# Patient Record
Sex: FEMALE | Race: WHITE | NOT HISPANIC OR LATINO | URBAN - METROPOLITAN AREA
[De-identification: names, ages, dates, MRNs, and addresses within clinical notes are randomized per-mention and may not be internally consistent; named-entity substitution may affect disease eponyms.]

---

## 2018-05-31 ENCOUNTER — INPATIENT (INPATIENT)
Facility: HOSPITAL | Age: 28
LOS: 0 days | Discharge: HOME | End: 2018-06-01
Attending: OBSTETRICS & GYNECOLOGY | Admitting: OBSTETRICS & GYNECOLOGY
Payer: MEDICAID

## 2018-05-31 VITALS — SYSTOLIC BLOOD PRESSURE: 127 MMHG | HEART RATE: 85 BPM | DIASTOLIC BLOOD PRESSURE: 72 MMHG

## 2018-05-31 LAB
AMPHET UR-MCNC: NEGATIVE — SIGNIFICANT CHANGE UP
APPEARANCE UR: (no result)
BACTERIA # UR AUTO: (no result) /HPF
BARBITURATES UR SCN-MCNC: NEGATIVE — SIGNIFICANT CHANGE UP
BASOPHILS # BLD AUTO: 0.05 K/UL — SIGNIFICANT CHANGE UP (ref 0–0.2)
BASOPHILS NFR BLD AUTO: 0.4 % — SIGNIFICANT CHANGE UP (ref 0–1)
BENZODIAZ UR-MCNC: NEGATIVE — SIGNIFICANT CHANGE UP
BILIRUB UR-MCNC: NEGATIVE — SIGNIFICANT CHANGE UP
BLD GP AB SCN SERPL QL: SIGNIFICANT CHANGE UP
COCAINE METAB.OTHER UR-MCNC: NEGATIVE — SIGNIFICANT CHANGE UP
COLOR SPEC: YELLOW — SIGNIFICANT CHANGE UP
DIFF PNL FLD: NEGATIVE — SIGNIFICANT CHANGE UP
EOSINOPHIL # BLD AUTO: 0.47 K/UL — SIGNIFICANT CHANGE UP (ref 0–0.7)
EOSINOPHIL NFR BLD AUTO: 4 % — SIGNIFICANT CHANGE UP (ref 0–8)
EPI CELLS # UR: (no result) /HPF
GLUCOSE UR QL: NEGATIVE MG/DL — SIGNIFICANT CHANGE UP
HCT VFR BLD CALC: 35.3 % — LOW (ref 37–47)
HGB BLD-MCNC: 12.4 G/DL — SIGNIFICANT CHANGE UP (ref 12–16)
IMM GRANULOCYTES NFR BLD AUTO: 1 % — HIGH (ref 0.1–0.3)
KETONES UR-MCNC: 15
LEUKOCYTE ESTERASE UR-ACNC: NEGATIVE — SIGNIFICANT CHANGE UP
LYMPHOCYTES # BLD AUTO: 19.9 % — LOW (ref 20.5–51.1)
LYMPHOCYTES # BLD AUTO: 2.33 K/UL — SIGNIFICANT CHANGE UP (ref 1.2–3.4)
MCHC RBC-ENTMCNC: 30.8 PG — SIGNIFICANT CHANGE UP (ref 27–31)
MCHC RBC-ENTMCNC: 35.1 G/DL — SIGNIFICANT CHANGE UP (ref 32–37)
MCV RBC AUTO: 87.6 FL — SIGNIFICANT CHANGE UP (ref 81–99)
METHADONE UR-MCNC: NEGATIVE — SIGNIFICANT CHANGE UP
MONOCYTES # BLD AUTO: 1.06 K/UL — HIGH (ref 0.1–0.6)
MONOCYTES NFR BLD AUTO: 9.1 % — SIGNIFICANT CHANGE UP (ref 1.7–9.3)
NEUTROPHILS # BLD AUTO: 7.67 K/UL — HIGH (ref 1.4–6.5)
NEUTROPHILS NFR BLD AUTO: 65.6 % — SIGNIFICANT CHANGE UP (ref 42.2–75.2)
NITRITE UR-MCNC: NEGATIVE — SIGNIFICANT CHANGE UP
NRBC # BLD: 0 /100 WBCS — SIGNIFICANT CHANGE UP (ref 0–0)
OPIATES UR-MCNC: NEGATIVE — SIGNIFICANT CHANGE UP
PCP SPEC-MCNC: SIGNIFICANT CHANGE UP
PH UR: 6.5 — SIGNIFICANT CHANGE UP (ref 5–8)
PLATELET # BLD AUTO: 237 K/UL — SIGNIFICANT CHANGE UP (ref 130–400)
PRENATAL SYPHILIS TEST: SIGNIFICANT CHANGE UP
PROPOXYPHENE QUALITATIVE URINE RESULT: NEGATIVE — SIGNIFICANT CHANGE UP
PROT UR-MCNC: NEGATIVE MG/DL — SIGNIFICANT CHANGE UP
RBC # BLD: 4.03 M/UL — LOW (ref 4.2–5.4)
RBC # FLD: 12.6 % — SIGNIFICANT CHANGE UP (ref 11.5–14.5)
SP GR SPEC: 1.01 — SIGNIFICANT CHANGE UP (ref 1.01–1.03)
TYPE + AB SCN PNL BLD: SIGNIFICANT CHANGE UP
UROBILINOGEN FLD QL: 0.2 MG/DL — SIGNIFICANT CHANGE UP (ref 0.2–0.2)
WBC # BLD: 11.7 K/UL — HIGH (ref 4.8–10.8)
WBC # FLD AUTO: 11.7 K/UL — HIGH (ref 4.8–10.8)
WBC UR QL: SIGNIFICANT CHANGE UP /HPF

## 2018-05-31 PROCEDURE — 59409 OBSTETRICAL CARE: CPT | Mod: U9

## 2018-05-31 RX ORDER — OXYTOCIN 10 UNIT/ML
125 VIAL (ML) INJECTION
Qty: 20 | Refills: 0 | Status: COMPLETED | OUTPATIENT
Start: 2018-05-31

## 2018-05-31 RX ORDER — OXYTOCIN 10 UNIT/ML
1 VIAL (ML) INJECTION
Qty: 30 | Refills: 0 | Status: DISCONTINUED | OUTPATIENT
Start: 2018-05-31 | End: 2018-06-01

## 2018-05-31 RX ORDER — AMPICILLIN TRIHYDRATE 250 MG
1 CAPSULE ORAL EVERY 4 HOURS
Qty: 0 | Refills: 0 | Status: DISCONTINUED | OUTPATIENT
Start: 2018-05-31 | End: 2018-05-31

## 2018-05-31 RX ORDER — OXYTOCIN 10 UNIT/ML
125 VIAL (ML) INJECTION
Qty: 20 | Refills: 0 | Status: DISCONTINUED | OUTPATIENT
Start: 2018-05-31 | End: 2018-06-01

## 2018-05-31 RX ORDER — DIBUCAINE 1 %
1 OINTMENT (GRAM) RECTAL EVERY 4 HOURS
Qty: 0 | Refills: 0 | Status: DISCONTINUED | OUTPATIENT
Start: 2018-05-31 | End: 2018-06-01

## 2018-05-31 RX ORDER — DOCUSATE SODIUM 100 MG
100 CAPSULE ORAL
Qty: 0 | Refills: 0 | Status: DISCONTINUED | OUTPATIENT
Start: 2018-05-31 | End: 2018-06-01

## 2018-05-31 RX ORDER — BENZOCAINE 10 %
1 GEL (GRAM) MUCOUS MEMBRANE EVERY 6 HOURS
Qty: 0 | Refills: 0 | Status: DISCONTINUED | OUTPATIENT
Start: 2018-05-31 | End: 2018-06-01

## 2018-05-31 RX ORDER — IBUPROFEN 200 MG
600 TABLET ORAL EVERY 6 HOURS
Qty: 0 | Refills: 0 | Status: DISCONTINUED | OUTPATIENT
Start: 2018-05-31 | End: 2018-06-01

## 2018-05-31 RX ORDER — SODIUM CHLORIDE 9 MG/ML
125 INJECTION, SOLUTION INTRAVENOUS ONCE
Qty: 0 | Refills: 0 | Status: DISCONTINUED | OUTPATIENT
Start: 2018-05-31 | End: 2018-05-31

## 2018-05-31 RX ORDER — SIMETHICONE 80 MG/1
80 TABLET, CHEWABLE ORAL EVERY 6 HOURS
Qty: 0 | Refills: 0 | Status: DISCONTINUED | OUTPATIENT
Start: 2018-05-31 | End: 2018-06-01

## 2018-05-31 RX ORDER — GLYCERIN ADULT
1 SUPPOSITORY, RECTAL RECTAL AT BEDTIME
Qty: 0 | Refills: 0 | Status: DISCONTINUED | OUTPATIENT
Start: 2018-05-31 | End: 2018-06-01

## 2018-05-31 RX ORDER — AMPICILLIN TRIHYDRATE 250 MG
CAPSULE ORAL
Qty: 0 | Refills: 0 | Status: DISCONTINUED | OUTPATIENT
Start: 2018-05-31 | End: 2018-05-31

## 2018-05-31 RX ORDER — DIPHENHYDRAMINE HCL 50 MG
25 CAPSULE ORAL EVERY 6 HOURS
Qty: 0 | Refills: 0 | Status: DISCONTINUED | OUTPATIENT
Start: 2018-05-31 | End: 2018-06-01

## 2018-05-31 RX ORDER — AER TRAVELER 0.5 G/1
1 SOLUTION RECTAL; TOPICAL EVERY 4 HOURS
Qty: 0 | Refills: 0 | Status: DISCONTINUED | OUTPATIENT
Start: 2018-05-31 | End: 2018-06-01

## 2018-05-31 RX ORDER — CITRIC ACID/SODIUM CITRATE 300-500 MG
15 SOLUTION, ORAL ORAL EVERY 4 HOURS
Qty: 0 | Refills: 0 | Status: DISCONTINUED | OUTPATIENT
Start: 2018-05-31 | End: 2018-05-31

## 2018-05-31 RX ORDER — SODIUM CHLORIDE 9 MG/ML
3 INJECTION INTRAMUSCULAR; INTRAVENOUS; SUBCUTANEOUS EVERY 8 HOURS
Qty: 0 | Refills: 0 | Status: DISCONTINUED | OUTPATIENT
Start: 2018-05-31 | End: 2018-06-01

## 2018-05-31 RX ORDER — CEFAZOLIN SODIUM 1 G
VIAL (EA) INJECTION
Qty: 0 | Refills: 0 | Status: DISCONTINUED | OUTPATIENT
Start: 2018-05-31 | End: 2018-05-31

## 2018-05-31 RX ORDER — SODIUM CHLORIDE 9 MG/ML
1000 INJECTION, SOLUTION INTRAVENOUS
Qty: 0 | Refills: 0 | Status: DISCONTINUED | OUTPATIENT
Start: 2018-05-31 | End: 2018-05-31

## 2018-05-31 RX ORDER — MAGNESIUM HYDROXIDE 400 MG/1
30 TABLET, CHEWABLE ORAL
Qty: 0 | Refills: 0 | Status: DISCONTINUED | OUTPATIENT
Start: 2018-05-31 | End: 2018-06-01

## 2018-05-31 RX ORDER — ACETAMINOPHEN 500 MG
650 TABLET ORAL EVERY 6 HOURS
Qty: 0 | Refills: 0 | Status: DISCONTINUED | OUTPATIENT
Start: 2018-05-31 | End: 2018-06-01

## 2018-05-31 RX ORDER — LANOLIN
1 OINTMENT (GRAM) TOPICAL EVERY 6 HOURS
Qty: 0 | Refills: 0 | Status: DISCONTINUED | OUTPATIENT
Start: 2018-05-31 | End: 2018-06-01

## 2018-05-31 RX ORDER — AMPICILLIN TRIHYDRATE 250 MG
2 CAPSULE ORAL ONCE
Qty: 0 | Refills: 0 | Status: COMPLETED | OUTPATIENT
Start: 2018-05-31 | End: 2018-05-31

## 2018-05-31 RX ADMIN — Medication 216 GRAM(S): at 04:43

## 2018-05-31 RX ADMIN — Medication 1 APPLICATION(S): at 16:31

## 2018-05-31 RX ADMIN — Medication 1 SPRAY(S): at 16:31

## 2018-05-31 RX ADMIN — Medication 108 GRAM(S): at 12:38

## 2018-05-31 RX ADMIN — Medication 600 MILLIGRAM(S): at 16:31

## 2018-05-31 RX ADMIN — Medication 1 MILLIUNIT(S)/MIN: at 08:39

## 2018-05-31 RX ADMIN — Medication 1 TABLET(S): at 21:44

## 2018-05-31 RX ADMIN — SODIUM CHLORIDE 3 MILLILITER(S): 9 INJECTION INTRAMUSCULAR; INTRAVENOUS; SUBCUTANEOUS at 21:28

## 2018-05-31 RX ADMIN — Medication 108 GRAM(S): at 21:31

## 2018-05-31 RX ADMIN — Medication 208 GRAM(S): at 08:35

## 2018-05-31 NOTE — OB PROVIDER H&P - HISTORY OF PRESENT ILLNESS
Pt is a 26yo  at 39w2d with EDC of 18 dated by LMP c/w first trimester sono, here for LOF since 0200, green in color, was sleeping and felt gush of fluid, continually leaking since. Associated with irregular ctx since arriving to triage, 1/10 intensity. Denies vaginal bleeding. Reports good fetal movement. Denies complications this pregnancy.

## 2018-05-31 NOTE — OB PROVIDER H&P - NSHPPHYSICALEXAM_GEN_ALL_CORE
Vital Signs Last 24 Hrs  T(C): 36.9 (31 May 2018 03:47), Max: 37.7 (31 May 2018 03:37)  T(F): 98.4 (31 May 2018 03:47), Max: 99.8 (31 May 2018 03:37)  HR: 85 (31 May 2018 03:47) (85 - 85)  BP: 127/72 (31 May 2018 03:47) (127/72 - 127/72)  RR: 16 (31 May 2018 03:47) (16 - 16)    Udip: 15 ketones, tr blood, tr protein, tr leuks    EFM: 120/mod/accels+  [place]: 2-4mins   SVE: 4/60/-2, vtx, grossly ruptured  Speculum: positive pooling, nitrazine, ferning, light meconium

## 2018-05-31 NOTE — OB PROVIDER DELIVERY SUMMARY - NSPROVIDERDELIVERYNOTE_OBGYN_ALL_OB_FT
Patient fully dilated, OA, pushed to deliver viable .  Apgar 9/9.  Placenta intact w/3vc.  First degree perineal laceration repaired w/3-0 chromic.   cc.  Tolerated well.  Patient received GBS prophylaxis.

## 2018-05-31 NOTE — OB PROVIDER H&P - NS_OBGYNHISTORY_OBGYN_ALL_OB_FT
OB:  - FTNSVD x3 - 12 (female), 2013 (female), 2015 (male), largest [id number]; ectopic treated with methotrexate   GYN: darnell

## 2018-05-31 NOTE — PROGRESS NOTE ADULT - SUBJECTIVE AND OBJECTIVE BOX
MICHAEL PGY1 Note:     Patient seen and examined at bedside. Comfortable, denies complaints.      T(C): 37.2 (18 @ 07:00), Max: 37.7 (18 @ 03:37)  HR: 79 (18 @ 09:31) (73 - 93)  BP: 107/53 (18 @ 09:31) (102/51 - 128/60)  RR: 18 (18 @ 06:51) (16 - 18)  EFM: 130/mod/accels+  [place]: Irregular ctx   SVE: deferred     Meds: ampicillin  IVPB 1 Gram(s) IV Intermittent every 4 hours  citric acid/sodium citrate Solution 15 milliLiter(s) Oral every 4 hours  lactated ringers Bolus 125 milliLiter(s) IV Bolus once  lactated ringers. 1000 milliLiter(s) IV Continuous <Continuous>  oxytocin Infusion 125 milliUNIT(s)/Min IV Continuous <Continuous>  oxytocin Infusion 1 milliUNIT(s)/Min IV Continuous <Continuous>      Labs:      AB pos, RPR- NR                  12.4   11.70 )-----------( 237      ( 31 May 2018 05:08 )             35.3         Urinalysis Basic - ( 31 May 2018 05:08 )    Color: Yellow / Appearance: Cloudy / S.015 / pH: x  Gluc: x / Ketone: 15  / Bili: Negative / Urobili: 0.2 mg/dL   Blood: x / Protein: Negative mg/dL / Nitrite: Negative   Leuk Esterase: Negative / RBC: x / WBC 1-2 /HPF   Sq Epi: x / Non Sq Epi: Few /HPF / Bacteria: Few /HPF          A/P: 28yo  at 39w2d GA, GBS pos, SROM, light meconium, in early labor on pitocin for augmentation  - c/w pitocin   - clear liquids   - pain mgmt prn   - continuous EFM/toco    Dr. Valle and Dr. Vega aware.

## 2018-05-31 NOTE — OB PROVIDER H&P - ASSESSMENT
Pt is a 26yo  at 39w2d, GBS bacteruria, SROM at 0200, light meconium and in early labor.  Admit to labor and delivery: clear liquid diet, IV hydration, continuous EFM/toco, pain management PRN, admission labs.  Ampicillin for GBS prophylaxis.

## 2018-05-31 NOTE — OB PROVIDER H&P - NSNYCREQUIREMENTS_OBGYN_ALL_OB
ADD [facility] REQUIREMENTS SECTION ([facility]/J/SI)...

## 2018-05-31 NOTE — OB PROVIDER H&P - ATTENDING COMMENTS
26 y/o p3013 at 39.2 wks, w/c/o srom, meconium stained fluid, occas ctx, no bleeding, good fm    nsd x 3  gbs +    plan: admit, analgesia, pitocin prn - r/b/a rev, ques answered, anticipate nsd

## 2018-06-01 ENCOUNTER — TRANSCRIPTION ENCOUNTER (OUTPATIENT)
Age: 28
End: 2018-06-01

## 2018-06-01 VITALS
TEMPERATURE: 97 F | RESPIRATION RATE: 18 BRPM | DIASTOLIC BLOOD PRESSURE: 73 MMHG | SYSTOLIC BLOOD PRESSURE: 124 MMHG | HEART RATE: 87 BPM

## 2018-06-01 LAB
HCT VFR BLD CALC: 32.7 % — LOW (ref 37–47)
HGB BLD-MCNC: 11 G/DL — LOW (ref 12–16)
MCHC RBC-ENTMCNC: 30 PG — SIGNIFICANT CHANGE UP (ref 27–31)
MCHC RBC-ENTMCNC: 33.6 G/DL — SIGNIFICANT CHANGE UP (ref 32–37)
MCV RBC AUTO: 89.1 FL — SIGNIFICANT CHANGE UP (ref 81–99)
NRBC # BLD: 0 /100 WBCS — SIGNIFICANT CHANGE UP (ref 0–0)
PLATELET # BLD AUTO: 201 K/UL — SIGNIFICANT CHANGE UP (ref 130–400)
RBC # BLD: 3.67 M/UL — LOW (ref 4.2–5.4)
RBC # FLD: 12.6 % — SIGNIFICANT CHANGE UP (ref 11.5–14.5)
WBC # BLD: 13.62 K/UL — HIGH (ref 4.8–10.8)
WBC # FLD AUTO: 13.62 K/UL — HIGH (ref 4.8–10.8)

## 2018-06-01 RX ORDER — ACETAMINOPHEN 500 MG
2 TABLET ORAL
Qty: 0 | Refills: 0 | DISCHARGE
Start: 2018-06-01

## 2018-06-01 RX ORDER — AER TRAVELER 0.5 G/1
1 SOLUTION RECTAL; TOPICAL
Qty: 0 | Refills: 0 | DISCHARGE
Start: 2018-06-01

## 2018-06-01 RX ORDER — IBUPROFEN 200 MG
1 TABLET ORAL
Qty: 0 | Refills: 0 | DISCHARGE
Start: 2018-06-01

## 2018-06-01 NOTE — PROGRESS NOTE ADULT - SUBJECTIVE AND OBJECTIVE BOX
OB attending  PPD #1    Pt doing well, pain well controlled. No overnight events, no acute complaints.    Ambulating: Yes  Voiding: Yes  Flatus: Yes  Bowel movements: Yes   Breast or bottle feeding: Breastfeeding  Diet: Regular    PAST MEDICAL & SURGICAL HISTORY:  No pertinent past medical history  No significant past surgical history      Physical Exam  Vital Signs Last 24 Hrs  T(C): 36.2 (2018 07:46), Max: 37.4 (31 May 2018 14:19)  T(F): 97.2 (2018 07:46), Max: 99.4 (31 May 2018 14:19)  HR: 87 (2018 07:46) (73 - 114)  BP: 124/73 (2018 07:46) (107/53 - 137/68)  BP(mean): --  RR: 18 (2018 07:46) (18 - 20)  SpO2: --  Gen: AAOx3, NAD  Abd: Soft, nontender, nondistended, BS+  Fundus: Firm, below umbilicus  Lochia: normal  Ext: No calf tenderness, no swelling    Labs:                        11.0   13.62 )-----------( 201      ( 2018 07:04 )             32.7       A/P:28 yo , s/p , PPD #1, doing well  - d/c home

## 2018-06-01 NOTE — DISCHARGE NOTE OB - MEDICATION SUMMARY - MEDICATIONS TO TAKE
I will START or STAY ON the medications listed below when I get home from the hospital:    acetaminophen 325 mg oral tablet  -- 2 tab(s) by mouth every 6 hours, As needed, Mild Pain  -- Indication: For pain    ibuprofen 600 mg oral tablet  -- 1 tab(s) by mouth every 6 hours, As needed, Moderate Pain  -- Indication: For pain    witch hazel 50% topical pad  -- 1 application on skin every 4 hours, As needed, Perineal Discomfort  -- Indication: For pain

## 2018-06-01 NOTE — DISCHARGE NOTE OB - CARE PROVIDER_API CALL
Manny Cheatham), Obstetrics and Gynecology  [address] [place] [address]  Phone: [phone]  Fax: [phone]

## 2018-06-01 NOTE — DISCHARGE NOTE OB - MEDICATION SUMMARY - MEDICATIONS TO STOP TAKING
I will STOP taking the medications listed below when I get home from the hospital:    acetaminophen 325 mg oral tablet  -- 2 tab(s) by mouth every 6 hours, As needed, Mild Pain    ibuprofen 600 mg oral tablet  -- 1 tab(s) by mouth every 6 hours, As needed, Moderate Pain    witch hazel 50% topical pad  -- 1 application on skin every 4 hours, As needed, Perineal Discomfort

## 2018-06-01 NOTE — DISCHARGE NOTE OB - HOSPITAL COURSE
DATE OF DISCHARGE: 18 @ 09:15    HISTORY OF PRESENT ILLNESS/HOSPITAL COURSE: HPI:  Pt is a 26yo  at 39w2d with EDC of 18 dated by LMP c/w first trimester sono, here for LOF since 0200, green in color, was sleeping and felt gush of fluid, continually leaking since. Associated with irregular ctx since arriving to triage, /10 intensity. Denies vaginal bleeding. Reports good fetal movement. Denies complications this pregnancy. (31 May 2018 04:17)    PAST MEDICAL & SURGICAL HISTORY:  No pertinent past medical history  No significant past surgical history      PROCEDURES PERFORMED: vaginal delivery	    COMPLICATIONS: uncomplicated     POST PARTUM COURSE: uncomplicated      FINAL DIAGNOSIS:  vaginal delivery    LABS:                       11.0   13.62 )-----------( 201      ( 2018 07:04 )             32.7

## 2018-06-06 DIAGNOSIS — Z3A.39 39 WEEKS GESTATION OF PREGNANCY: ICD-10-CM

## 2018-06-06 DIAGNOSIS — Z34.83 ENCOUNTER FOR SUPERVISION OF OTHER NORMAL PREGNANCY, THIRD TRIMESTER: ICD-10-CM

## 2019-04-30 ENCOUNTER — APPOINTMENT (OUTPATIENT)
Dept: ANTEPARTUM | Facility: CLINIC | Age: 29
End: 2019-04-30
Payer: MEDICAID

## 2019-04-30 ENCOUNTER — ASOB RESULT (OUTPATIENT)
Age: 29
End: 2019-04-30

## 2019-04-30 PROCEDURE — 99201 OFFICE OUTPATIENT NEW 10 MINUTES: CPT | Mod: 25,TH

## 2019-04-30 PROCEDURE — 76801 OB US < 14 WKS SINGLE FETUS: CPT

## 2019-05-14 ENCOUNTER — RESULT REVIEW (OUTPATIENT)
Age: 29
End: 2019-05-14

## 2019-05-14 ENCOUNTER — OUTPATIENT (OUTPATIENT)
Dept: OUTPATIENT SERVICES | Facility: HOSPITAL | Age: 29
LOS: 1 days | Discharge: HOME | End: 2019-05-14
Payer: MEDICAID

## 2019-05-14 ENCOUNTER — APPOINTMENT (OUTPATIENT)
Dept: ANTEPARTUM | Facility: CLINIC | Age: 29
End: 2019-05-14

## 2019-05-14 VITALS
DIASTOLIC BLOOD PRESSURE: 54 MMHG | SYSTOLIC BLOOD PRESSURE: 104 MMHG | HEART RATE: 82 BPM | OXYGEN SATURATION: 99 % | RESPIRATION RATE: 20 BRPM

## 2019-05-14 VITALS
DIASTOLIC BLOOD PRESSURE: 66 MMHG | HEIGHT: 64 IN | TEMPERATURE: 98 F | WEIGHT: 156.31 LBS | SYSTOLIC BLOOD PRESSURE: 130 MMHG | RESPIRATION RATE: 18 BRPM | OXYGEN SATURATION: 97 % | HEART RATE: 83 BPM

## 2019-05-14 PROCEDURE — 59820 CARE OF MISCARRIAGE: CPT

## 2019-05-14 PROCEDURE — 88305 TISSUE EXAM BY PATHOLOGIST: CPT | Mod: 26

## 2019-05-14 RX ORDER — SODIUM CHLORIDE 9 MG/ML
1000 INJECTION, SOLUTION INTRAVENOUS
Refills: 0 | Status: DISCONTINUED | OUTPATIENT
Start: 2019-05-14 | End: 2019-05-29

## 2019-05-14 RX ORDER — OXYCODONE AND ACETAMINOPHEN 5; 325 MG/1; MG/1
1 TABLET ORAL EVERY 4 HOURS
Refills: 0 | Status: DISCONTINUED | OUTPATIENT
Start: 2019-05-14 | End: 2019-05-14

## 2019-05-14 RX ORDER — ONDANSETRON 8 MG/1
4 TABLET, FILM COATED ORAL ONCE
Refills: 0 | Status: DISCONTINUED | OUTPATIENT
Start: 2019-05-14 | End: 2019-05-29

## 2019-05-14 RX ADMIN — SODIUM CHLORIDE 100 MILLILITER(S): 9 INJECTION, SOLUTION INTRAVENOUS at 14:34

## 2019-05-14 RX ADMIN — Medication 270 MILLIGRAM(S): at 14:54

## 2019-05-14 NOTE — H&P ADULT - NSHPPHYSICALEXAM_GEN_ALL_CORE
ICU Vital Signs Last 24 Hrs  T(C): 36.9 (14 May 2019 12:15), Max: 36.9 (14 May 2019 12:15)  T(F): 98.4 (14 May 2019 12:15), Max: 98.4 (14 May 2019 12:15)  HR: 83 (14 May 2019 12:15) (83 - 83)  BP: 130/66 (14 May 2019 12:15) (130/66 - 130/66)  BP(mean): --  ABP: --  ABP(mean): --  RR: 18 (14 May 2019 12:15) (18 - 18)  SpO2: 97% (14 May 2019 12:15) (97% - 97%)  GEN: NAD  Heart: RRR  Lungs CTAB  ABd: soft non tender  SVE: deferred

## 2019-05-14 NOTE — H&P ADULT - HISTORY OF PRESENT ILLNESS
27 yo P4 @ 12w6d by LMP (2019 presents for scheduled suction D&C for missed ab. Denies fever/chills, nausea/vomiting, diarrhea. + vaginal bleeding, no clots. No abd cramping.  h/o  x 4, no complications.  no h/o fibroids, cysts, STIs, abnormal pap smears

## 2019-05-14 NOTE — PRE-ANESTHESIA EVALUATION ADULT - NSATTENDATTESTRD_GEN_ALL_CORE
The patient has been re-examined and I agree with the above assessment or I updated with my findings. - - -

## 2019-05-14 NOTE — PRE-ANESTHESIA EVALUATION ADULT - NSANTHOSAYNRD_GEN_A_CORE
No. LETY screening performed.  STOP BANG Legend: 0-2 = LOW Risk; 3-4 = INTERMEDIATE Risk; 5-8 = HIGH Risk

## 2019-05-14 NOTE — ASU DISCHARGE PLAN (ADULT/PEDIATRIC) - CARE PROVIDER_API CALL
Manny Cheatham)  Obstetrics and Gynecology  [address] [place] [address]  Phone: [phone]  Fax: [phone]  Follow Up Time: 2 weeks

## 2019-05-14 NOTE — BRIEF OPERATIVE NOTE - NSICDXBRIEFPROCEDURE_GEN_ALL_CORE_FT
PROCEDURES:  Dilation and curettage of uterus using suction for missed  in first trimester 14-May-2019 14:25:31  Mickey Mcduffie

## 2019-05-14 NOTE — ASU DISCHARGE PLAN (ADULT/PEDIATRIC) - CALL YOUR DOCTOR IF YOU HAVE ANY OF THE FOLLOWING:
Nausea and vomiting that does not stop/Unable to urinate/Pain not relieved by Medications/Bleeding that does not stop/Inability to tolerate liquids or foods

## 2019-05-15 LAB — SURGICAL PATHOLOGY STUDY: SIGNIFICANT CHANGE UP

## 2019-05-18 DIAGNOSIS — O02.1 MISSED ABORTION: ICD-10-CM

## 2019-09-17 NOTE — OB RN PATIENT PROFILE - NS_ACCOMPAINEDBY_OBGYN_ALL_OB
Pt provided results and follow up recommendation.  She stated she does get these often and denies having any pain or irregular bleeding.     Spouse

## 2019-10-17 ENCOUNTER — ASOB RESULT (OUTPATIENT)
Age: 29
End: 2019-10-17

## 2019-10-17 ENCOUNTER — APPOINTMENT (OUTPATIENT)
Dept: ANTEPARTUM | Facility: CLINIC | Age: 29
End: 2019-10-17
Payer: MEDICAID

## 2019-10-17 PROCEDURE — 76820 UMBILICAL ARTERY ECHO: CPT

## 2019-10-17 PROCEDURE — 76805 OB US >/= 14 WKS SNGL FETUS: CPT

## 2019-10-31 ENCOUNTER — APPOINTMENT (OUTPATIENT)
Dept: ANTEPARTUM | Facility: CLINIC | Age: 29
End: 2019-10-31
Payer: MEDICAID

## 2019-10-31 ENCOUNTER — ASOB RESULT (OUTPATIENT)
Age: 29
End: 2019-10-31

## 2019-10-31 PROCEDURE — 76811 OB US DETAILED SNGL FETUS: CPT

## 2019-10-31 PROCEDURE — 76820 UMBILICAL ARTERY ECHO: CPT

## 2019-11-12 ENCOUNTER — APPOINTMENT (OUTPATIENT)
Dept: MATERNAL FETAL MEDICINE | Facility: CLINIC | Age: 29
End: 2019-11-12

## 2019-11-12 ENCOUNTER — APPOINTMENT (OUTPATIENT)
Dept: ANTEPARTUM | Facility: CLINIC | Age: 29
End: 2019-11-12

## 2020-03-30 ENCOUNTER — OUTPATIENT (OUTPATIENT)
Dept: OUTPATIENT SERVICES | Facility: HOSPITAL | Age: 30
LOS: 1 days | Discharge: HOME | End: 2020-03-30
Payer: MEDICAID

## 2020-03-30 VITALS
HEART RATE: 18 BPM | SYSTOLIC BLOOD PRESSURE: 137 MMHG | DIASTOLIC BLOOD PRESSURE: 78 MMHG | RESPIRATION RATE: 95 BRPM | TEMPERATURE: 99 F

## 2020-03-30 VITALS — HEART RATE: 82 BPM | DIASTOLIC BLOOD PRESSURE: 60 MMHG | SYSTOLIC BLOOD PRESSURE: 118 MMHG

## 2020-03-30 LAB
ALBUMIN SERPL ELPH-MCNC: 3.9 G/DL — SIGNIFICANT CHANGE UP (ref 3.5–5.2)
ALP SERPL-CCNC: 157 U/L — HIGH (ref 30–115)
ALT FLD-CCNC: 9 U/L — SIGNIFICANT CHANGE UP (ref 0–41)
ANION GAP SERPL CALC-SCNC: 14 MMOL/L — SIGNIFICANT CHANGE UP (ref 7–14)
APPEARANCE UR: ABNORMAL
AST SERPL-CCNC: 19 U/L — SIGNIFICANT CHANGE UP (ref 0–41)
BACTERIA # UR AUTO: ABNORMAL
BASOPHILS # BLD AUTO: 0.03 K/UL — SIGNIFICANT CHANGE UP (ref 0–0.2)
BASOPHILS NFR BLD AUTO: 0.3 % — SIGNIFICANT CHANGE UP (ref 0–1)
BILIRUB SERPL-MCNC: 0.3 MG/DL — SIGNIFICANT CHANGE UP (ref 0.2–1.2)
BILIRUB UR-MCNC: NEGATIVE — SIGNIFICANT CHANGE UP
BLD GP AB SCN SERPL QL: SIGNIFICANT CHANGE UP
BUN SERPL-MCNC: 8 MG/DL — LOW (ref 10–20)
CALCIUM SERPL-MCNC: 9.2 MG/DL — SIGNIFICANT CHANGE UP (ref 8.5–10.1)
CHLORIDE SERPL-SCNC: 103 MMOL/L — SIGNIFICANT CHANGE UP (ref 98–110)
CO2 SERPL-SCNC: 20 MMOL/L — SIGNIFICANT CHANGE UP (ref 17–32)
COLOR SPEC: SIGNIFICANT CHANGE UP
CREAT ?TM UR-MCNC: 80 MG/DL — SIGNIFICANT CHANGE UP
CREAT SERPL-MCNC: 0.5 MG/DL — LOW (ref 0.7–1.5)
DIFF PNL FLD: NEGATIVE — SIGNIFICANT CHANGE UP
EOSINOPHIL # BLD AUTO: 0.13 K/UL — SIGNIFICANT CHANGE UP (ref 0–0.7)
EOSINOPHIL NFR BLD AUTO: 1.2 % — SIGNIFICANT CHANGE UP (ref 0–8)
EPI CELLS # UR: 4 /HPF — SIGNIFICANT CHANGE UP (ref 0–5)
GLUCOSE SERPL-MCNC: 66 MG/DL — LOW (ref 70–99)
GLUCOSE UR QL: NEGATIVE — SIGNIFICANT CHANGE UP
HCT VFR BLD CALC: 36.1 % — LOW (ref 37–47)
HGB BLD-MCNC: 12.6 G/DL — SIGNIFICANT CHANGE UP (ref 12–16)
HYALINE CASTS # UR AUTO: 1 /LPF — SIGNIFICANT CHANGE UP (ref 0–7)
IMM GRANULOCYTES NFR BLD AUTO: 0.8 % — HIGH (ref 0.1–0.3)
KETONES UR-MCNC: NEGATIVE — SIGNIFICANT CHANGE UP
LDH SERPL L TO P-CCNC: 214 — SIGNIFICANT CHANGE UP (ref 50–242)
LEUKOCYTE ESTERASE UR-ACNC: ABNORMAL
LYMPHOCYTES # BLD AUTO: 2.28 K/UL — SIGNIFICANT CHANGE UP (ref 1.2–3.4)
LYMPHOCYTES # BLD AUTO: 21.6 % — SIGNIFICANT CHANGE UP (ref 20.5–51.1)
MCHC RBC-ENTMCNC: 31.3 PG — HIGH (ref 27–31)
MCHC RBC-ENTMCNC: 34.9 G/DL — SIGNIFICANT CHANGE UP (ref 32–37)
MCV RBC AUTO: 89.6 FL — SIGNIFICANT CHANGE UP (ref 81–99)
MONOCYTES # BLD AUTO: 0.89 K/UL — HIGH (ref 0.1–0.6)
MONOCYTES NFR BLD AUTO: 8.4 % — SIGNIFICANT CHANGE UP (ref 1.7–9.3)
NEUTROPHILS # BLD AUTO: 7.17 K/UL — HIGH (ref 1.4–6.5)
NEUTROPHILS NFR BLD AUTO: 67.7 % — SIGNIFICANT CHANGE UP (ref 42.2–75.2)
NITRITE UR-MCNC: NEGATIVE — SIGNIFICANT CHANGE UP
NRBC # BLD: 0 /100 WBCS — SIGNIFICANT CHANGE UP (ref 0–0)
PH UR: 6 — SIGNIFICANT CHANGE UP (ref 5–8)
PLATELET # BLD AUTO: 242 K/UL — SIGNIFICANT CHANGE UP (ref 130–400)
POTASSIUM SERPL-MCNC: 4.1 MMOL/L — SIGNIFICANT CHANGE UP (ref 3.5–5)
POTASSIUM SERPL-SCNC: 4.1 MMOL/L — SIGNIFICANT CHANGE UP (ref 3.5–5)
PRENATAL SYPHILIS TEST: SIGNIFICANT CHANGE UP
PROT ?TM UR-MCNC: 15 MG/DLG/24H — SIGNIFICANT CHANGE UP
PROT SERPL-MCNC: 6.4 G/DL — SIGNIFICANT CHANGE UP (ref 6–8)
PROT UR-MCNC: NEGATIVE — SIGNIFICANT CHANGE UP
PROT/CREAT UR-RTO: 0.2 RATIO — SIGNIFICANT CHANGE UP (ref 0–0.2)
RBC # BLD: 4.03 M/UL — LOW (ref 4.2–5.4)
RBC # FLD: 12.6 % — SIGNIFICANT CHANGE UP (ref 11.5–14.5)
RBC CASTS # UR COMP ASSIST: 3 /HPF — SIGNIFICANT CHANGE UP (ref 0–4)
SODIUM SERPL-SCNC: 137 MMOL/L — SIGNIFICANT CHANGE UP (ref 135–146)
SP GR SPEC: 1.01 — SIGNIFICANT CHANGE UP (ref 1.01–1.02)
URATE SERPL-MCNC: 5.1 MG/DL — SIGNIFICANT CHANGE UP (ref 2.5–7)
UROBILINOGEN FLD QL: SIGNIFICANT CHANGE UP
WBC # BLD: 10.58 K/UL — SIGNIFICANT CHANGE UP (ref 4.8–10.8)
WBC # FLD AUTO: 10.58 K/UL — SIGNIFICANT CHANGE UP (ref 4.8–10.8)
WBC UR QL: 34 /HPF — HIGH (ref 0–5)

## 2020-03-30 PROCEDURE — 99213 OFFICE O/P EST LOW 20 MIN: CPT | Mod: 25,TH

## 2020-03-30 PROCEDURE — 59025 FETAL NON-STRESS TEST: CPT | Mod: 26

## 2020-03-30 NOTE — OB PROVIDER TRIAGE NOTE - NSHPPHYSICALEXAM_GEN_ALL_CORE
Vital Signs Last 24 Hrs  T(C): 37 (30 Mar 2020 15:07), Max: 37 (30 Mar 2020 15:07)  T(F): 98.6 (30 Mar 2020 15:07), Max: 98.6 (30 Mar 2020 15:07)  HR: 87 (30 Mar 2020 15:52) (18 - 95)  BP: 131/64 (30 Mar 2020 15:52) (131/64 - 139/68)  RR: 95 (30 Mar 2020 15:01) (95 - 95)    Gen: NAD, sitting comfortably  Abd: Gravid, soft, NT, no palpable ctx  SVE: deferred per PMD  EFM: 130/mod/+accels, cat I  [place]: none  Sono: cephalic, BPP 8/8, MVP 3.3cm, left lateral placenta Vital Signs Last 24 Hrs  T(C): 37 (30 Mar 2020 15:07), Max: 37 (30 Mar 2020 15:07)  T(F): 98.6 (30 Mar 2020 15:07), Max: 98.6 (30 Mar 2020 15:07)  HR: 87 (30 Mar 2020 15:52) (18 - 95)  BP: 131/64 (30 Mar 2020 15:52) (131/64 - 139/68)  RR: 95 (30 Mar 2020 15:01) (95 - 95)    Gen: NAD, sitting comfortably  Abd: Gravid, soft, NT, no palpable ctx  SVE: 1/0/-3, vtx, intact  EFM: 130/mod/+accels, cat I  [place]: none  Sono: cephalic, BPP 8/8, MVP 3.3cm, left lateral placenta  Speculum: no lesions on cervix, in vagina or on labia

## 2020-03-30 NOTE — OB PROVIDER TRIAGE NOTE - NSHPLABSRESULTS_GEN_ALL_CORE
Labs:  3/3   HIV NR  RPR NR    12/11       11/20  HSV 1 pos  rubella nonimmune  GBS pos bacteruria  TORCH neg  HSV1 pos IgG    8/26  ucx GBS pos  8/21  AB pos, antibody neg  HIV NR  varicella immune  rubella immune  mumps immune  measles immune  HBsAG NR  RPR NR    Sono:  10/17: 143g (3%), post placenta, UA dopplers normal  10/31: EFW 218g (3%), normal anatomy, post placenta  11/11: 248g (5%), normal anatomy, normal dopplers  12/11: 565g (15%), MVP 3.1cm, vtx

## 2020-03-30 NOTE — OB PROVIDER TRIAGE NOTE - HISTORY OF PRESENT ILLNESS
30yo  @39w4d with YNAICK 4/2 by 1st trimester sonogram presenting to L&D for BP monitoring after elevated BPs in the office of 146/88. Denies headache, changes in vision, chest pain, SOB, RUQ/epigastric pain, N/V, fevers, chills, diarrhea, LE pain/swelling. Denies VB, LOF, ctx.  Good FM.  H/o HSV1 positive serology and cold sores, last one 3months ago.  No history of genital sores, no prodromal symptoms. GBS positive bacteruria.

## 2020-03-30 NOTE — OB PROVIDER TRIAGE NOTE - NSOBPROVIDERNOTE_OBGYN_ALL_OB_FT
30yo  @39w4d, GBS pos, r/o gHTN vs. preeclampsia, BPP 8/8, NST reactive, maternal and fetal wellbeing reassuring  -cont efm/toco  -f/u PELs, UA, urprcr  -BPs q15m      Dr. Murrell and Dr. Mueller aware. 30yo  @39w4d, GBS pos, r/o gHTN vs. preeclampsia, BPP 8/8, NST reactive, maternal and fetal wellbeing reassuring  -cont efm/toco  -f/u PELs, UA, urprcr  -BPs q15m    Dr. Murrell and Dr. Mueller aware.    Addendum: Patient was monitored for 4 hours with all BPs wnl, no criteria met.  Pt remained asymptomatic.  BPP 8/8, NST reactive, Cat I tracing with minimal irregular contractions. PELs within normal limits.  Urprcr pending. To be discharged home with hypertensive and labor precautions.  FKC encouraged. PO hydration encouraged.

## 2020-03-30 NOTE — OB PROVIDER TRIAGE NOTE - NS_OBGYNHISTORY_OBGYN_ALL_OB_FT
OB: FT  x4, largest 9lb, no complication s  SAB x1 with D&C  Ectopic s/p mtx 2016    Gyn: denies fibroids, cysts, abnormal pap smears, STDs.  H/o HSV1 positive serology, h/o cold sores, no history of genital sores.

## 2020-04-04 ENCOUNTER — INPATIENT (INPATIENT)
Facility: HOSPITAL | Age: 30
LOS: 1 days | Discharge: HOME | End: 2020-04-06
Attending: OBSTETRICS & GYNECOLOGY | Admitting: OBSTETRICS & GYNECOLOGY
Payer: MEDICAID

## 2020-04-04 VITALS — TEMPERATURE: 99 F | HEART RATE: 92 BPM | SYSTOLIC BLOOD PRESSURE: 115 MMHG | DIASTOLIC BLOOD PRESSURE: 79 MMHG

## 2020-04-04 DIAGNOSIS — Z98.890 OTHER SPECIFIED POSTPROCEDURAL STATES: Chronic | ICD-10-CM

## 2020-04-04 LAB
BASOPHILS # BLD AUTO: 0.04 K/UL — SIGNIFICANT CHANGE UP (ref 0–0.2)
BASOPHILS NFR BLD AUTO: 0.4 % — SIGNIFICANT CHANGE UP (ref 0–1)
BLD GP AB SCN SERPL QL: SIGNIFICANT CHANGE UP
EOSINOPHIL # BLD AUTO: 0.09 K/UL — SIGNIFICANT CHANGE UP (ref 0–0.7)
EOSINOPHIL NFR BLD AUTO: 0.8 % — SIGNIFICANT CHANGE UP (ref 0–8)
HCT VFR BLD CALC: 38.7 % — SIGNIFICANT CHANGE UP (ref 37–47)
HGB BLD-MCNC: 13.1 G/DL — SIGNIFICANT CHANGE UP (ref 12–16)
IMM GRANULOCYTES NFR BLD AUTO: 0.7 % — HIGH (ref 0.1–0.3)
LYMPHOCYTES # BLD AUTO: 2.29 K/UL — SIGNIFICANT CHANGE UP (ref 1.2–3.4)
LYMPHOCYTES # BLD AUTO: 20.9 % — SIGNIFICANT CHANGE UP (ref 20.5–51.1)
MCHC RBC-ENTMCNC: 30.3 PG — SIGNIFICANT CHANGE UP (ref 27–31)
MCHC RBC-ENTMCNC: 33.9 G/DL — SIGNIFICANT CHANGE UP (ref 32–37)
MCV RBC AUTO: 89.6 FL — SIGNIFICANT CHANGE UP (ref 81–99)
MONOCYTES # BLD AUTO: 0.89 K/UL — HIGH (ref 0.1–0.6)
MONOCYTES NFR BLD AUTO: 8.1 % — SIGNIFICANT CHANGE UP (ref 1.7–9.3)
NEUTROPHILS # BLD AUTO: 7.57 K/UL — HIGH (ref 1.4–6.5)
NEUTROPHILS NFR BLD AUTO: 69.1 % — SIGNIFICANT CHANGE UP (ref 42.2–75.2)
NRBC # BLD: 0 /100 WBCS — SIGNIFICANT CHANGE UP (ref 0–0)
PLATELET # BLD AUTO: 253 K/UL — SIGNIFICANT CHANGE UP (ref 130–400)
PRENATAL SYPHILIS TEST: SIGNIFICANT CHANGE UP
RBC # BLD: 4.32 M/UL — SIGNIFICANT CHANGE UP (ref 4.2–5.4)
RBC # FLD: 12.4 % — SIGNIFICANT CHANGE UP (ref 11.5–14.5)
WBC # BLD: 10.96 K/UL — HIGH (ref 4.8–10.8)
WBC # FLD AUTO: 10.96 K/UL — HIGH (ref 4.8–10.8)

## 2020-04-04 PROCEDURE — 99231 SBSQ HOSP IP/OBS SF/LOW 25: CPT

## 2020-04-04 PROCEDURE — 59409 OBSTETRICAL CARE: CPT | Mod: U9

## 2020-04-04 RX ORDER — DIPHENHYDRAMINE HCL 50 MG
25 CAPSULE ORAL EVERY 6 HOURS
Refills: 0 | Status: DISCONTINUED | OUTPATIENT
Start: 2020-04-04 | End: 2020-04-06

## 2020-04-04 RX ORDER — SIMETHICONE 80 MG/1
80 TABLET, CHEWABLE ORAL EVERY 4 HOURS
Refills: 0 | Status: DISCONTINUED | OUTPATIENT
Start: 2020-04-04 | End: 2020-04-06

## 2020-04-04 RX ORDER — OXYCODONE HYDROCHLORIDE 5 MG/1
5 TABLET ORAL
Refills: 0 | Status: DISCONTINUED | OUTPATIENT
Start: 2020-04-04 | End: 2020-04-06

## 2020-04-04 RX ORDER — OXYCODONE HYDROCHLORIDE 5 MG/1
5 TABLET ORAL ONCE
Refills: 0 | Status: DISCONTINUED | OUTPATIENT
Start: 2020-04-04 | End: 2020-04-06

## 2020-04-04 RX ORDER — PRAMOXINE HYDROCHLORIDE 150 MG/15G
1 AEROSOL, FOAM RECTAL EVERY 4 HOURS
Refills: 0 | Status: DISCONTINUED | OUTPATIENT
Start: 2020-04-04 | End: 2020-04-06

## 2020-04-04 RX ORDER — ACETAMINOPHEN 500 MG
975 TABLET ORAL
Refills: 0 | Status: DISCONTINUED | OUTPATIENT
Start: 2020-04-04 | End: 2020-04-06

## 2020-04-04 RX ORDER — IBUPROFEN 200 MG
600 TABLET ORAL EVERY 6 HOURS
Refills: 0 | Status: COMPLETED | OUTPATIENT
Start: 2020-04-04 | End: 2021-03-03

## 2020-04-04 RX ORDER — LANOLIN
1 OINTMENT (GRAM) TOPICAL EVERY 6 HOURS
Refills: 0 | Status: DISCONTINUED | OUTPATIENT
Start: 2020-04-04 | End: 2020-04-06

## 2020-04-04 RX ORDER — IBUPROFEN 200 MG
600 TABLET ORAL EVERY 6 HOURS
Refills: 0 | Status: DISCONTINUED | OUTPATIENT
Start: 2020-04-04 | End: 2020-04-06

## 2020-04-04 RX ORDER — AMPICILLIN TRIHYDRATE 250 MG
2 CAPSULE ORAL ONCE
Refills: 0 | Status: COMPLETED | OUTPATIENT
Start: 2020-04-04 | End: 2020-04-04

## 2020-04-04 RX ORDER — INFLUENZA VIRUS VACCINE 15; 15; 15; 15 UG/.5ML; UG/.5ML; UG/.5ML; UG/.5ML
0.5 SUSPENSION INTRAMUSCULAR ONCE
Refills: 0 | Status: DISCONTINUED | OUTPATIENT
Start: 2020-04-04 | End: 2020-04-04

## 2020-04-04 RX ORDER — HYDROCORTISONE 1 %
1 OINTMENT (GRAM) TOPICAL EVERY 6 HOURS
Refills: 0 | Status: DISCONTINUED | OUTPATIENT
Start: 2020-04-04 | End: 2020-04-06

## 2020-04-04 RX ORDER — AMPICILLIN TRIHYDRATE 250 MG
1 CAPSULE ORAL EVERY 4 HOURS
Refills: 0 | Status: DISCONTINUED | OUTPATIENT
Start: 2020-04-04 | End: 2020-04-04

## 2020-04-04 RX ORDER — BENZOCAINE 10 %
1 GEL (GRAM) MUCOUS MEMBRANE EVERY 6 HOURS
Refills: 0 | Status: DISCONTINUED | OUTPATIENT
Start: 2020-04-04 | End: 2020-04-06

## 2020-04-04 RX ORDER — AER TRAVELER 0.5 G/1
1 SOLUTION RECTAL; TOPICAL EVERY 4 HOURS
Refills: 0 | Status: DISCONTINUED | OUTPATIENT
Start: 2020-04-04 | End: 2020-04-06

## 2020-04-04 RX ORDER — SODIUM CHLORIDE 9 MG/ML
3 INJECTION INTRAMUSCULAR; INTRAVENOUS; SUBCUTANEOUS EVERY 8 HOURS
Refills: 0 | Status: DISCONTINUED | OUTPATIENT
Start: 2020-04-04 | End: 2020-04-06

## 2020-04-04 RX ORDER — SODIUM CHLORIDE 9 MG/ML
1000 INJECTION, SOLUTION INTRAVENOUS
Refills: 0 | Status: DISCONTINUED | OUTPATIENT
Start: 2020-04-04 | End: 2020-04-04

## 2020-04-04 RX ORDER — GLYCERIN ADULT
1 SUPPOSITORY, RECTAL RECTAL AT BEDTIME
Refills: 0 | Status: DISCONTINUED | OUTPATIENT
Start: 2020-04-04 | End: 2020-04-06

## 2020-04-04 RX ORDER — DIBUCAINE 1 %
1 OINTMENT (GRAM) RECTAL EVERY 6 HOURS
Refills: 0 | Status: DISCONTINUED | OUTPATIENT
Start: 2020-04-04 | End: 2020-04-06

## 2020-04-04 RX ORDER — OXYTOCIN 10 UNIT/ML
333.33 VIAL (ML) INJECTION
Qty: 20 | Refills: 0 | Status: DISCONTINUED | OUTPATIENT
Start: 2020-04-04 | End: 2020-04-04

## 2020-04-04 RX ORDER — MAGNESIUM HYDROXIDE 400 MG/1
30 TABLET, CHEWABLE ORAL
Refills: 0 | Status: DISCONTINUED | OUTPATIENT
Start: 2020-04-04 | End: 2020-04-06

## 2020-04-04 RX ORDER — OXYTOCIN 10 UNIT/ML
333.33 VIAL (ML) INJECTION
Qty: 20 | Refills: 0 | Status: DISCONTINUED | OUTPATIENT
Start: 2020-04-04 | End: 2020-04-06

## 2020-04-04 RX ORDER — OXYTOCIN 10 UNIT/ML
1 VIAL (ML) INJECTION
Qty: 30 | Refills: 0 | Status: DISCONTINUED | OUTPATIENT
Start: 2020-04-04 | End: 2020-04-04

## 2020-04-04 RX ORDER — KETOROLAC TROMETHAMINE 30 MG/ML
30 SYRINGE (ML) INJECTION ONCE
Refills: 0 | Status: DISCONTINUED | OUTPATIENT
Start: 2020-04-04 | End: 2020-04-04

## 2020-04-04 RX ADMIN — Medication 108 GRAM(S): at 13:28

## 2020-04-04 RX ADMIN — Medication 216 GRAM(S): at 09:35

## 2020-04-04 RX ADMIN — SODIUM CHLORIDE 3 MILLILITER(S): 9 INJECTION INTRAMUSCULAR; INTRAVENOUS; SUBCUTANEOUS at 20:49

## 2020-04-04 RX ADMIN — Medication 1 MILLIUNIT(S)/MIN: at 14:33

## 2020-04-04 RX ADMIN — Medication 30 MILLIGRAM(S): at 16:27

## 2020-04-04 NOTE — OB PROVIDER IHI INDUCTION/AUGMENTATION NOTE - NS_CHECKALL_OBGYN_ALL_OB
Contractions pattern was reviewed/Induction / Augmentation was discussed/Order was written/H&P was completed/FHR was reviewed

## 2020-04-04 NOTE — OB PROVIDER H&P - ASSESSMENT
28yo  @40w2d, GBS pos, grand multipara, HSV1 positive serology not on valtrex, s/p SROM clear @0500, in labor  -admit to L&D  -cont efm/toco  -clear liquid diet, IVF  -ampicillin for GBS prophylaxis  -pain management prn  -f/u admission labs      Dr. Murrell and Dr. Byers aware.

## 2020-04-04 NOTE — OB PROVIDER DELIVERY SUMMARY - NSPROVIDERDELIVERYNOTE_OBGYN_ALL_OB_FT
Patient was fully dilated and pushing. Fetal head was OA and restituted to LOT. The anterior and posterior shoulders delivered, followed by the remaining body atraumatically. Delayed cord clamping was performed, and then clamped and cut. Cord blood gases collected x2. The  was handed to the mother. The placenta delivered intact with membranes. Pitocin was administered. Uterus massaged, fundus found to be firm. Cervix, vagina and perineum inspected. Second degree laceration was noted, repaired using 2-0 chromic in the usual fashion with good hemostasis.     Viable male infant delivered, weighing 3080 gms, with APGARs 9/9    Lacteration: 2nd degree laceration    cc

## 2020-04-04 NOTE — OB PROVIDER H&P - HISTORY OF PRESENT ILLNESS
30yo  @40w2d with YANICK 4/2 by first trimester sonogram presenting for clear leakage of fluid @0500 this AM and contractions now q2-3m.  Denies VB.  Good FM.  Was seen in L&D for BP monitoring 1 week ago after elevated BP in office, 146/88.  Did not meet any hypertensive criteria.  Denies headache, changes in vision, chest pain, SOB, RUQ/epigastric pain, N/V, fevers, chills, diarrhea, LE pain/swelling.  H/o HSV1 positive serology and cold sores, last one 3months ago.  No history of genital sores, no prodromal symptoms. GBS positive bacteruria. Denies any other complications with this pregnancy. 30yo  @40w2d with YANICK 4/2 by first trimester sonogram presenting for clear leakage of fluid @0500 this AM and contractions now q2-3m.  Denies VB.  Good FM.  Was seen in L&D for BP monitoring 1 week ago after elevated BP in office, 146/88.  Did not meet any hypertensive criteria.  Denies headache, changes in vision, chest pain, SOB, RUQ/epigastric pain, N/V, fevers, chills, diarrhea, LE pain/swelling.  H/o HSV1 positive serology and cold sores, last one 3months ago.  No history of genital sores, no prodromal symptoms. Denies any other complications with this pregnancy. GBS positive bacteruria.

## 2020-04-04 NOTE — OB PROVIDER H&P - NSHPLABSRESULTS_GEN_ALL_CORE
Labs:  3/3   HIV NR  RPR NR    12/11       11/20  HSV 1 pos  rubella nonimmune  GBS pos bacteruria  TORCH neg  HSV1 pos IgG    8/26  ucx GBS pos  8/21  AB pos, antibody neg  HIV NR  varicella immune  rubella immune  mumps immune  measles immune  HBsAG NR  RPR NR    Sono:  10/17: 143g (3%), post placenta, UA dopplers normal  10/31: EFW 218g (3%), normal anatomy, post placenta  11/11: 248g (5%), normal anatomy, normal dopplers  12/11: 565g (15%), MVP 3.1cm, vtx Labs:  3/3   HIV NR  RPR NR    12/11       11/20  HSV 1 pos  rubella nonimmune  GBS pos bacteruria  TORCH neg  HSV1 pos IgG    8/26  ucx GBS pos    8/21  AB pos, antibody neg  HIV NR  varicella immune  rubella immune  mumps immune  measles immune  HBsAG NR  RPR NR    Sono:  10/17: 143g (3%), post placenta, UA dopplers normal  10/31: EFW 218g (3%), normal anatomy, post placenta  11/11: 248g (5%), normal anatomy, normal dopplers  12/11: 565g (15%), MVP 3.1cm, vtx

## 2020-04-04 NOTE — OB PROVIDER H&P - NS_OBGYNHISTORY_OBGYN_ALL_OB_FT
OB: FT  x4, largest 9lb, no complications  SAB x1 with D&C  Ectopic s/p mtx 2016    Gyn: denies fibroids, cysts, abnormal pap smears, STDs.  H/o HSV1 positive serology, h/o cold sores, no history of genital sores. OB: FT  x4, largest 9-5lb, no complications, no history of PPH  SAB with D&C  Ectopic s/p mtx 2016    Gyn: denies fibroids, cysts, abnormal pap smears, STDs.  H/o HSV1 positive serology, h/o cold sores, no history of genital sores.

## 2020-04-04 NOTE — OB PROVIDER H&P - NSHPPHYSICALEXAM_GEN_ALL_CORE
Vital Signs Last 24 Hrs  T(C): 36.7 (04 Apr 2020 09:19), Max: 37.1 (04 Apr 2020 09:14)  T(F): 98 (04 Apr 2020 09:19), Max: 98.7 (04 Apr 2020 09:14)  HR: 92 (04 Apr 2020 09:19) (92 - 92)  BP: 115/79 (04 Apr 2020 09:19) (115/79 - 115/79)  RR: 20 (04 Apr 2020 09:19) (20 - 20)    Gen: NAD, sitting comfortably between contractions  Cardio: RRR, no m/r/g  Resp: CTAB, no w/r/r  Abd: Gravid, soft, NT, strongly palpable ctx  Ext: 2+ DTRs  SVE: 7.5/80/-2, vtx, ruptured  EFM: 130/mod/+accels, cat I  [place]: q4-5m  Speculum: +nitrazine, +pooling, +ferning, no lesions on cervix, in vagina or on labia

## 2020-04-05 ENCOUNTER — TRANSCRIPTION ENCOUNTER (OUTPATIENT)
Age: 30
End: 2020-04-05

## 2020-04-05 LAB
BASOPHILS # BLD AUTO: 0.03 K/UL — SIGNIFICANT CHANGE UP (ref 0–0.2)
BASOPHILS NFR BLD AUTO: 0.3 % — SIGNIFICANT CHANGE UP (ref 0–1)
EOSINOPHIL # BLD AUTO: 0.22 K/UL — SIGNIFICANT CHANGE UP (ref 0–0.7)
EOSINOPHIL NFR BLD AUTO: 2.3 % — SIGNIFICANT CHANGE UP (ref 0–8)
HCT VFR BLD CALC: 30.4 % — LOW (ref 37–47)
HGB BLD-MCNC: 10.2 G/DL — LOW (ref 12–16)
IMM GRANULOCYTES NFR BLD AUTO: 0.6 % — HIGH (ref 0.1–0.3)
LYMPHOCYTES # BLD AUTO: 2.56 K/UL — SIGNIFICANT CHANGE UP (ref 1.2–3.4)
LYMPHOCYTES # BLD AUTO: 26.9 % — SIGNIFICANT CHANGE UP (ref 20.5–51.1)
MCHC RBC-ENTMCNC: 30.3 PG — SIGNIFICANT CHANGE UP (ref 27–31)
MCHC RBC-ENTMCNC: 33.6 G/DL — SIGNIFICANT CHANGE UP (ref 32–37)
MCV RBC AUTO: 90.2 FL — SIGNIFICANT CHANGE UP (ref 81–99)
MONOCYTES # BLD AUTO: 0.94 K/UL — HIGH (ref 0.1–0.6)
MONOCYTES NFR BLD AUTO: 9.9 % — HIGH (ref 1.7–9.3)
NEUTROPHILS # BLD AUTO: 5.72 K/UL — SIGNIFICANT CHANGE UP (ref 1.4–6.5)
NEUTROPHILS NFR BLD AUTO: 60 % — SIGNIFICANT CHANGE UP (ref 42.2–75.2)
NRBC # BLD: 0 /100 WBCS — SIGNIFICANT CHANGE UP (ref 0–0)
PLATELET # BLD AUTO: 202 K/UL — SIGNIFICANT CHANGE UP (ref 130–400)
RBC # BLD: 3.37 M/UL — LOW (ref 4.2–5.4)
RBC # FLD: 12.6 % — SIGNIFICANT CHANGE UP (ref 11.5–14.5)
WBC # BLD: 9.53 K/UL — SIGNIFICANT CHANGE UP (ref 4.8–10.8)
WBC # FLD AUTO: 9.53 K/UL — SIGNIFICANT CHANGE UP (ref 4.8–10.8)

## 2020-04-05 PROCEDURE — 99231 SBSQ HOSP IP/OBS SF/LOW 25: CPT

## 2020-04-05 RX ADMIN — SODIUM CHLORIDE 3 MILLILITER(S): 9 INJECTION INTRAMUSCULAR; INTRAVENOUS; SUBCUTANEOUS at 06:05

## 2020-04-05 NOTE — DISCHARGE NOTE OB - HOSPITAL COURSE
DATE OF DISCHARGE: 20 @ 10:29    HISTORY OF PRESENT ILLNESS/HOSPITAL COURSE: HPI:  28yo  @40w2d with YANICK 4/2 by first trimester sonogram presenting for clear leakage of fluid @0500 this AM and contractions now q2-3m.  Denies VB.  Good FM.  Was seen in L&D for BP monitoring 1 week ago after elevated BP in office, 146/88.  Did not meet any hypertensive criteria.  Denies headache, changes in vision, chest pain, SOB, RUQ/epigastric pain, N/V, fevers, chills, diarrhea, LE pain/swelling.  H/o HSV1 positive serology and cold sores, last one 3months ago.  No history of genital sores, no prodromal symptoms. Denies any other complications with this pregnancy. GBS positive bacteruria. (2020 09:29)    PAST MEDICAL & SURGICAL HISTORY:  No pertinent past medical history  H/O dilation and curettage      PROCEDURES PERFORMED: vaginal delivery    COMPLICATIONS:  none    POST PARTUM COURSE: uncomplicated       FINAL DIAGNOSIS:  normal vaginal delivery    LABS:                       10.2   9.53  )-----------( 202      ( 2020 05:31 )             30.4

## 2020-04-05 NOTE — DISCHARGE NOTE OB - PATIENT PORTAL LINK FT
You can access the FollowMyHealth Patient Portal offered by [facility] by registering at the following website: [email or web address]. By joining [facility]’s FollowMyHealth portal, you will also be able to view your health information using other applications (apps) compatible with our system.

## 2020-04-05 NOTE — DISCHARGE NOTE OB - CARE PROVIDER_API CALL
Manny Cheatham)  Obstetrics and Gynecology  [address] [place] [address]  Phone: [phone]  Fax: [phone]  Follow Up Time:

## 2020-04-05 NOTE — PROGRESS NOTE ADULT - SUBJECTIVE AND OBJECTIVE BOX
OB attending  PPD #1    Pt doing well, pain well controlled. No overnight events, no acute complaints.    Ambulating: Yes  Voiding: Yes  Flatus: Yes  Bowel movements: Yes   Breast or bottle feeding: Breastfeeding  Diet: Regular    PAST MEDICAL & SURGICAL HISTORY:  No pertinent past medical history  H/O dilation and curettage      Physical Exam  Vital Signs Last 24 Hrs  T(C): 35.9 (2020 09:10), Max: 36.7 (2020 14:30)  T(F): 96.7 (2020 09:10), Max: 98.1 (2020 14:30)  HR: 77 (2020 09:10) (58 - 80)  BP: 118/60 (2020 09:10) (109/53 - 133/73)  BP(mean): --  RR: 19 (2020 09:10) (18 - 19)  SpO2: --  Gen: AAOx3, NAD  Abd: Soft, nontender, nondistended, BS+  Fundus: Firm, below umbilicus  Lochia: normal  Ext: No calf tenderness, no swelling    Labs:                        10.2   9.53  )-----------( 202      ( 2020 05:31 )             30.4         A/P:30 yo P5, s/p , PPD #1, doing well  - continue current management  - d/c home

## 2020-04-06 VITALS
SYSTOLIC BLOOD PRESSURE: 139 MMHG | HEART RATE: 100 BPM | DIASTOLIC BLOOD PRESSURE: 73 MMHG | TEMPERATURE: 97 F | RESPIRATION RATE: 18 BRPM

## 2020-04-06 PROCEDURE — 99231 SBSQ HOSP IP/OBS SF/LOW 25: CPT

## 2020-04-06 NOTE — PROGRESS NOTE ADULT - SUBJECTIVE AND OBJECTIVE BOX
OB attending  PPD #2    Pt doing well, pain well controlled. No overnight events, no acute complaints.    Ambulating: Yes  Voiding: Yes  Flatus: Yes  Bowel movements: Yes   Breast or bottle feeding: Breastfeeding  Diet: Regular    PAST MEDICAL & SURGICAL HISTORY:  No pertinent past medical history  H/O dilation and curettage      Physical Exam  Vital Signs Last 24 Hrs  T(C): 35.9 (2020 07:52), Max: 37.4 (2020 15:30)  T(F): 96.7 (2020 07:52), Max: 99.4 (2020 15:30)  HR: 100 (2020 07:52) (68 - 100)  BP: 139/73 (2020 07:52) (118/73 - 139/73)  BP(mean): --  RR: 18 (2020 07:52) (18 - 20)  SpO2: --  Gen: AAOx3, NAD  Abd: Soft, nontender, nondistended, BS+  Fundus: Firm, below umbilicus  Lochia: normal  Ext: No calf tenderness, no swelling    Labs:                        10.2   9.53  )-----------( 202      ( 2020 05:31 )             30.4         A/P: s/p , PPD #2, doing well  - d/c home

## 2020-04-09 DIAGNOSIS — Z3A.40 40 WEEKS GESTATION OF PREGNANCY: ICD-10-CM

## 2020-04-09 DIAGNOSIS — Z34.80 ENCOUNTER FOR SUPERVISION OF OTHER NORMAL PREGNANCY, UNSPECIFIED TRIMESTER: ICD-10-CM

## 2020-08-30 ENCOUNTER — FORM ENCOUNTER (OUTPATIENT)
Age: 30
End: 2020-08-30

## 2020-09-29 ENCOUNTER — FORM ENCOUNTER (OUTPATIENT)
Age: 30
End: 2020-09-29

## 2020-10-12 ENCOUNTER — FORM ENCOUNTER (OUTPATIENT)
Age: 30
End: 2020-10-12

## 2020-10-29 NOTE — OB RN DELIVERY SUMMARY - BABY A: DATE/TIME OF DELIVERY
SEVERITY:- OTHERWISE NORMAL ECG -

SINUS RHYTHM (CORONARY SINUS RHYTHM)

:

Confirmed by: Bala Armstrong MD 29-Oct-2020 07:08:15 04-Apr-2020 14:05

## 2021-01-01 NOTE — PROCEDURE NOTE - NSASPIRATION_OBGYN_ALL_OB
Negative
1. Follow up with your primary care physician within 2-3days for reevaluation.  2.  Return to the Emergency Department for worsening, progressive or any other concerning symptoms.

## 2021-02-02 ENCOUNTER — FORM ENCOUNTER (OUTPATIENT)
Age: 31
End: 2021-02-02

## 2021-11-29 NOTE — OB PROVIDER H&P - NSINFECTIONS_OBGYN_ALL_OB
Pediatric Well Child Exam: 10 Years of age    Chief Complaint   Patient presents with   • Well Child       SUBJECTIVE:  Albert Crawford  is a 10 year old female who presents for a well child exam.  Patient presents with sister and mom.     CONCERNS RAISED TODAY:     No concerns.     DIET/GI:  Appetite: Good.  Milk: 2 Percent, 3 cups/day.  Food:   · Eats fruits/vegetables: [x]  YES     []  NO   · Eats meat: [x]  YES     []  NO   Problems with BOWEL MOVEMENTs: []  YES     [x]  NO     PHYSICAL ACTIVITY        Playtime (60 min/day): [x]  YES     []  NO         Electronic Screen time: 2 hours/day    SLEEP PATTERN:   8 hours/night.    SCHOOL HISTORY:  Facility Type: Attending Public School - name: [employer].  Grade in school: Third Grade    DEVELOPMENT:  [x]  YES    []  NO      []  UNKNOWN    Has success in school?  [x]  YES    []  NO      []  UNKNOWN    Has friends/does well socially?  [x]  YES    []  NO      []  UNKNOWN    Participates in after school/outside activities?  [x]  YES    []  NO      []  UNKNOWN    Gets along with family?  [x]  YES    []  NO      []  UNKNOWN    Does chores when asked?  [x]  YES    []  NO      []  UNKNOWN    Given chances to make own decisions?  [x]  YES    []  NO      []  UNKNOWN    Feels good about self/generally happy?    PAST HISTORIES:  Allergies, Medications, Medical history, Surgical histroy, Family history reviewed and updated.  Toxic Exposure:   Tobacco Use: Never             RECENT HEALTH EVENTS:  Illnesses: None.  Hospitalizations: None.  Injuries or Accidents: None.    REVIEW OF SYSTEMS:    All systems reviewed and negative except as documented in \"Concerns raised\".    OBJECTIVE:    VISION SCREENING:   No exam data present  PHYSICAL EXAM:   VITAL SIGNS:   Visit Vitals  /60   Pulse 110   Temp 98.1 °F (36.7 °C) (Oral)   Resp 18   Ht 4' 7.91\" (1.42 m)   Wt (!) 47 kg (103 lb 9.9 oz)   SpO2 99%   BMI 23.31 kg/m²    98 %ile (Z= 2.03) based on CDC (Girls, 2-20 Years) weight-for-age data  using vitals from 11/29/2021.  GENERAL: Well appearing female child.  Alert and active.  SKIN:  Warm, normal turgor.  No cyanosis.  No rash.  HEAD:  Normocephalic, atraumatic.    EYES:  Conjunctivae appear normal, noninjected, nonicteric.   NOSE:  Appears normal without drainage.  EARS:  Normal external auditory canals.  THROAT:  Oropharynx with moist mucous membranes without lesions.  NECK:  Supple, no lymphadenopathy or masses.  HEART:  Regular rate and rhythm.  Normal S1, S2.  No murmurs, rubs, gallops.   LUNGS:  Clear to auscultation.  No wheezes, rales, rhonchi.  Normal work effort with breathing.  ABDOMEN:  Bowel sounds present. Soft, nontender. No hepatomegaly, splenomegaly or masses.  EXTREMITIES:  Symmetrical muscle mass, strength all extremities.    BACK: Spine straight.   NEUROLOGIC:  Oriented x4. No gross lateralizing signs or focal deficits.  Normal gait.         ASSESSMENT/PLAN  Albert was seen today for well child.    Diagnoses and all orders for this visit:    Encounter for routine child health examination with abnormal findings, doing well. Development appropriate. Weight in 98%. Encouraged healthy diet and daily exercise. Consider lipid panel, a1c, lipid panel if weight does not improve. Flu vaccine given today.     Need for vaccination  -     INFLUENZA QUADRIVALENT SPLIT PRES FREE 0.5 ML VACC, IM (FLULAVAL,FLUARIX,FLUZONE)    BMI (body mass index), pediatric 95-99% for age, obese child structured weight management/multidisciplinary intervention category, extensive counseling on weight reduction and healthy diet.     All parental concerns and questions discussed.  Anticipatory guidance provided, handout/s given Safety: Car, bicycle, fire, sharp objects, falls, water  Development  Diet  Discipline  Television  Analgesics/antipyretics  Sun exposure  Tobacco-free home  Dental care  Immunizations per orders. VIS for Immunizations given. Risks, benefits, and side effects discussed.    Follow up: Return  in about 1 year (around 11/29/2022) for well child.     Patient was staffed with Dr. Garret DO, who agrees with the above plan.     Gillian Johnson DO  Family Medicine, PGY-2  11/30/2021   None

## 2022-01-04 NOTE — OB RN PATIENT PROFILE - POST PARTUM DEPRESSION SCREEN OB 5
With regard to your feelings of self harm, Dr. Reynolds would like for you to call the main office number to schedule and IOP. Call [phone] to schedule that.  If you can not get in within the next week, shortness for would like to see in the office within the week.  Call his office to schedule an appointment at [phone].  If you have any feelings of harming yourself or others, call 911 and go to the emergency room immediately for further evaluation.    With regards to your upset stomach, follow up with primary care provider via phone visit in 2-3 days if no improvement. Go to the Emergency Room immediately if you have shortness of breath, trouble breathing, chest pain, signs of dehydration (decreased urination, dry skin/mouth), dizziness, your symptoms worsen, you develop new symptoms, or if you have persistent fevers of 100.4 degrees or higher.     Tylenol/Acetaminophen or Ibuprofen every 6-8 hours as directed as needed for pain or fever. Fluids and adequate rest encouraged. Frequent handwashing.     Intranasal Saline sprays may be useful for treating congestion by reducing inflammation and thinning mucus. Saline nasal irrigations (a neti pot) may be helpful in relieving nasal symptoms. You may also try over-the-counter plain Mucinex once or twice day as directed on the label for at least 3 days to thin out and decrease mucus secretions.     I recommend that you continue to self quarantine at home and isolate yourself from others. According to the CDC guidelines you may discontinue isolation under the following conditions:  · At least 24 hours have passed since recovery defined as resolution of fever without the use of fever-reducing medications and   · Improvement in respiratory symptoms (e.g., cough, shortness of breath); and,   · At least 10 days have passed since symptoms first appeared.    Post Screening Coronavirus Discharge Instructions    • The medical staff has determined that you are safe to go home.  If you test positive, I recommend that you and the rest of your household members follow some simple instructions to remain safe and healthy:   • Implement Self quarantine (Isolation) for 10 days.  Do not leave your house or have visitors.  Please keep at least 6 feet away from any family members in your home.  Wash your hands often, cover your mouth when you cough.  Do not share any personal items.  Postpone all non-essential medical appointments until you are out of isolation.  Please make every effort to protect those around you and to reduce the spread of the virus. Be especially careful when near or around high-risk individuals, which includes the elderly and anyone with chronic medical disease (including lung disease, cancer, diabetes, etc..).    • Smoking may increase your chances of developing more severe disease if you contract Coronavirus. If you do smoke, today is the best day of your life to stop.    Symptoms of Coronavirus disease 2019 (COVID-19)  The following are the most common symptoms that may appear 2-14 days after exposure.  • Fever  • Cough  • Shortness of breath    Recommendations for using a facemask:  · Currently the CDC recommends the use of a face mask while in public to stem spread of corona virus.  · Facemasks should be used by people who show symptoms of COVID-19 to help prevent the spread of the disease to others. The use of facemasks is also crucial for health workers.  · Coronavirus (COVID-19) Testing  · Your healthcare professional will work with your state's public health department and CDC to determine if you need to be tested for COVID-19. Only certain persons meet criteria for COVID-19 testing and testing will depend on state and CDC guidelines.    SELF ISOLATION FOR PEOPLE BEING EVALUATED FOR COVID-19    Self isolation:  Stay home:  Or another location approved by Public Health if they have contacted you.  This means do not go to work, school, or public areas.  While at home  separate your self from other people in her home as much as possible.  You should stay any specific room and away from other people in your home.  Also you should use a separate bathroom if available.      Avoid sharing personal household items.  You should not share dishes, drinking glasses, pops, eating utensils, towels, or bedding with other people in her home.  After using these items, they should be washed thoroughly with soap and water.      Wash your hands often and practice good hygiene.    Where a face mask if you need to be around other people and cover your mouth and nose with a tissue when you cough and sneeze.  Immediately dispose of that tissue in a trash can then wash your hands.    Postpone all nonessential medical appointments until you are out of isolation.  If you have an essential appointment during his isolation.  Please tell your local health department who can help you coordinate the visit.  Please also called the physician  practice that you have the appointment with and let them know that you are under isolation for COVID-19 and they help determine whether not your appointment is essential or nonessential at this time.    If you need medical care, it is important that you follow the following instructions.  1. If you are experiencing acute shortness of breath, low oxygen, high heart rate, you feel like you may lose consciousness or any other severe symptoms you should call 911. Please let the dispatch know of your symptoms may be consistent with COVID-19 or the you have been tested for COVID-19, so that EMS can take appropriate precautions for infection spread.  Do not use public transportation, ride sharing, or taxis while you are under self isolation.  2. If your symptoms are mild but bothersome including fever, cough, vomiting, diarrhea, without the above symptoms please call your primary care doctor.  Your primary care doctor will help you determine whether or not you need to seek  emergency medical care.    Self-monitoring  Measure your temperature four times a day and your oxygen level with a pulse oximeter. Make sure that your oxygen level does not drop below 92%. If your oxygen level drops below 90%, go to the emergency room immediately for further evaluation. You may purchase a pulse oximeter online. If you do not have thermometer, or need instructions for using one, let your Public Health Department know.     Home Remedies    • Normal Saline Nasal Sprays/Sinus Rinse/Neti Pot: Use 1 to 2 times per day to help with nasal congestion, dryness, postnasal drip, and runny nose. (See more details below)   • Humidifiers/Vaporizers: Adds moisture to the air, which helps ease coughing and congestion. Mucus tends to pull in the extra moisture, which thins it out and makes it easier to expel from the body.  Adding essential oils to a diffuser is often helpful, try Eucalyptus and Tea Tree Oil.  • Vicks VapoRub, Mentholatum: Contain a mixture of camphor, menthol, and eucalyptus for cough and congestion. When these products are inhaled, they create a local anesthetic sensation and a sense of improved airflow. (See below for more details on \"chest compress\" steam wraps for your chest.)  • Warm Steam Showers/Baths: The warm mist from the steamy bathroom relaxes the airways, loosens mucus, and allows for easier breathing. A nice big pot of soup on the stove helps too!   • Honey: Helps to relieve cough and relieves throat irritation. Use 1/2 to 2 teaspoons (tsp).  It's great to add to hot (decaffeinated) tea=more humidity! Warning:  Do not give honey to children under one year old.   • Gargle with warm salt water: Helps relieve sore throat.  Mix 1/4 to 1/2 teaspoon (tsp) salt with 1 cup (8 ounces) of warm water. (Use SEA salt if possible, it stings less. See below for details on \"throat compress\".)  • Vitamin D3: The vitamin is needed to boost our immune system and help our bodies fight off infection.   Extra supplements during the winter and times of illness are recommended.  For adults, try 5,000-10,000 international units daily.   • Use Gravity: Elevating the head above the heart helps decrease congestion, which is primarily caused by swollen blood vessels in the lining of the nose.  • Lay on your chest: If you have cough, chest congestion or shortness of breath, laying on your chest (\"face-down\") can improve the oxygenation of your lungs.  • Consider Other Supplements (typical adult doses provided):   o Vitamin C: 500-1000mg daily  o NAC (N-Acetyl Cysteine): 1200mg daily  o Probiotics: 30-50 Billion live organisms daily with food  o Zinc: 50-75mg two times daily  o Oscillococcinum®: Take as package recommends   o Umcka ColdCare (Pelargonium): Take as package recommends (more details below)  Chest Compress - Good for ages 3 months old and up.   -A 3-step steam-based therapy to break coughing fits and loosen up coughs.   -Great for relief of cough at night or during naptimes.  1) Smear a thin layer of Vicks Vaporub (kids older than 2 years) or Vicks Babyrub (kids ages 3 months to 2 years) on the chest; do not get Vicks in eyes or mouth!  2) Soak a thin cloth (such as, bandana or handkerchief) in cold water, wring it out, and lay it over the Vaporub area.  3) Wrap the entire torso in a dry towel.  When the thin cloth is dry, do steps 1-3 again.  In small children/infants, remove the entire compress after the first 30 minutes of sleep, do not have them wear it all night.    Throat Compress - to soothe sore throat and decrease throat/gland swelling   1) Fold a thin cloth (such as a bandana or handkerchief) into a long strip, soak it in cold water, wring it out and wrap it around the neck.  2) Cover the cloth with a dry cloth (I find that a tube-sock works well for this). Fasten it with a safety pin. When the cloth gets dry, repeat steps 1 and 2 again.  ---In small children/infants, remove the entire compress after  the first 30 minutes of sleep, do not have them wear it all night.---    Umcka ColdCare products (by [employer]) -- Do not take if you have liver issues.  Herbal/homeopathic remedy. Made from a [place] Geranium root (Pelargonium sidoides).  Clinically proven to reduce duration and severity of common-cold head/throat/chest virus symptoms, up to 3 days FASTER than Vitamin C, Zinc or Echinacea! Works best if started within first 3 days of symptoms.  · Available over-the-counter without prescription at [facility] in [place] and [facility]; often available at [employer]/natural food/health stores, and through [employer].com.  For Kids age 6 and older and Adults:  A good-tasting melting/chewable lozenge  • DIRECTIONS:  Start taking at first sign of a cold, 3 lozenges/day, until symptoms are gone.  For Kids age 2 and older:  A yummy syrup in various fruit flavors  · DIRECTIONS:  Start taking at first sign of a cold, dose as instructed on bottle 2 times/day, until symptoms are gone.  · If the bottle says \"For ages under 6, ask a doctor\" -- Dose for ages 2 to 5 years old:  2.5 mL taken 2 times per day.  Depression  Depression is one of the most common mental health problems today. It is not just a state of unhappiness or sadness. It is a true disease. The cause seems to be linked to a drop in chemicals that transmit signals in the brain. Having a family history of depression, alcoholism, or suicide increases the risk. Chronic illness, chronic pain, migraine headaches, and high emotional stress also increase the risk.   Depression is something we tend to recognize in others. But we may have a hard time seeing it in ourselves. It can show in many physical and emotional ways:   · Loss of appetite  · Overeating  · Not being able to sleep  · Sleeping too much  · Excessive tiredness not linked to physical exertion  · Restlessness or irritability  · Slowness of movement or speech  · Feeling depressed or  withdrawn  · Loss of interest in things you once enjoyed  · Trouble concentrating, remembering, or making decisions  · Thoughts of harming or killing oneself, or thoughts that life is not worth living  · Low self-esteem  The treatment for depression may include both medicine and psychotherapy. Antidepressants can reduce suffering. They can also improve the ability to function during the depressed period. Therapy can offer emotional support. It can also help you understand emotional factors that may be causing the depression.   Home care  · Ongoing care and support help people manage this disease. Find a healthcare provider and therapist who meet your needs. Seek help when you feel like you may be getting ill.  · Be kind to yourself. Make it a point to do things that you enjoy (gardening, walking in nature, going to a movie). Reward yourself for small successes.  · Once you start your medicine, expect a slow decrease in your symptoms. Depression will lift gradually, not right away. Ask your healthcare provider how long it will take for the medicine to start working.  · Take care of your physical body. Eat a balanced diet (low in saturated fat and high in fruits and vegetables). Exercise at least 3 times a week for 30 minutes. Even mild to moderate exercise (like brisk walking) can make you feel better.  · Don't make major decisions, such as a job change, a divorce, or a marriage until you feel better.  · Don't drink alcohol. It can make depression worse.  · Take medicine as prescribed. Don't stop your medicine or adjust the dose unless you talk with your healthcare provider.  · Don't share your medicine or use someone else's medicine.  · Tell all your healthcare providers about all the prescription and over-the-counter medicines, vitamins, and supplements you take. Certain supplements interact with medicines. They can cause dangerous side effects. Ask your pharmacist about medicine interactions when you have  questions.  · Talk with your family and trusted friends about your feelings and thoughts. Ask them to help you notice behavior changes early. You can then get help and, if needed, your medicine can be adjusted.  · Talk with your healthcare provider if you are not getting better. He or she may change your medicine or have you try another treatment.    Follow-up care  Follow up with your healthcare provider as advised.   Call 911  Call 911 if you:   · Have suicidal thoughts, a suicide plan, and the means to carry out the plan  · Have serious thoughts of hurting someone else  · Have trouble breathing  · Are very confused  · Feel very drowsy or have trouble awakening  · Faint or lose consciousness  · Have new chest pain that becomes more severe, lasts longer, or spreads into your shoulder, arm, neck, jaw, or back  When to seek medical advice  Call your healthcare provider right away if any of these happen:  · Worsening of your symptoms  · Feeling extreme depression, fear, anxiety, or anger toward yourself or others  · Feeling out of control  · Feeling that you may try to harm yourself or another  · Hearing voices that others do not hear  · Seeing things that others do not see  · Not sleeping or eating for 3 days in a row  · Having friends or family express concern over your behavior and ask you to seek help    --------------------------------------------------------------------------------------------------------------  Starting April 9, 2020, we have new hours and policies:    [facility]    Monday - Friday 8:00 a.m. - 8:00 p.m.  Saturday  8:00 a.m. - 4:00 p.m.  Sunday  CLOSED     -*-*-*-*-*-*-*-  The [facility] is OPEN on SUNDAYS  -*-*-*-*-*-*-*-  During the COVID-19 pandemic:  • [facility] \"[facility] URI [facility]\" will manage patients with possible COVID-19 symptoms  • [facility] will manage patients with other issues  • All patients  presenting to any Urgent Care will be interviewed by a nurse to determine the most appropriate location for their visit    -*-*-*-*-*-*-*-  [email or web address]  See current wait times for [facility] in real-time  Reserve your waiting-room spot in line     [email or web address] [employer] [email or web address]  [place] for the patient portal  See test results and make virtual visits with your primary care provider  ----------------  Thank you for choosing [facility] today.  We hope you had a pleasant experience and we look forward to serving your future needs.    If you receive a survey in the mail about today's services, we hope that you will take a few minutes to let us know about your experience.    · If you have any questions about your VISIT, please call [phone]    · If you have any questions about your BILL, please call [phone]    · If you need a copy of your MEDICAL RECORD, please call [phone]    · If you have any questions about [facility], please call [phone]    ------------------------------------------------------------------------------------------------------------  UNLESS OTHERWISE INSTRUCTED BY YOUR URGENT CARE PROVIDER TODAY, all follow-up for your medical issues should be managed by your primary care provider. The Urgent Care does not manage chronic medical issues or refill medications. You are responsible for scheduling and keeping any necessary follow-up visits with your primary care provider after this visit today.   ------------------------------------------------------------------------------------------------------------     no

## 2022-01-05 ENCOUNTER — FORM ENCOUNTER (OUTPATIENT)
Age: 32
End: 2022-01-05

## 2022-03-07 NOTE — DISCHARGE NOTE OB - PATIENT PORTAL LINK FT
You can access the [facility] Patient Portal, offered by [facility], by registering with the following website: [email or web address]

## 2022-04-28 ENCOUNTER — FORM ENCOUNTER (OUTPATIENT)
Age: 32
End: 2022-04-28

## 2022-04-29 VITALS
HEIGHT: 64 IN | BODY MASS INDEX: 26.46 KG/M2 | DIASTOLIC BLOOD PRESSURE: 58 MMHG | WEIGHT: 155 LBS | SYSTOLIC BLOOD PRESSURE: 115 MMHG

## 2022-10-26 ENCOUNTER — APPOINTMENT (OUTPATIENT)
Dept: OBGYN | Facility: CLINIC | Age: 32
End: 2022-10-26

## 2022-10-26 ENCOUNTER — NON-APPOINTMENT (OUTPATIENT)
Age: 32
End: 2022-10-26

## 2022-10-26 VITALS
BODY MASS INDEX: 25.99 KG/M2 | WEIGHT: 156 LBS | DIASTOLIC BLOOD PRESSURE: 82 MMHG | SYSTOLIC BLOOD PRESSURE: 121 MMHG | HEIGHT: 65 IN

## 2022-10-26 DIAGNOSIS — Z98.890 OTHER SPECIFIED POSTPROCEDURAL STATES: ICD-10-CM

## 2022-10-26 DIAGNOSIS — Z78.9 OTHER SPECIFIED HEALTH STATUS: ICD-10-CM

## 2022-10-26 DIAGNOSIS — Z87.440 PERSONAL HISTORY OF URINARY (TRACT) INFECTIONS: ICD-10-CM

## 2022-10-26 DIAGNOSIS — Z87.42 PERSONAL HISTORY OF OTHER DISEASES OF THE FEMALE GENITAL TRACT: ICD-10-CM

## 2022-10-26 DIAGNOSIS — O02.1 MISSED ABORTION: ICD-10-CM

## 2022-10-26 DIAGNOSIS — Z97.5 PRESENCE OF (INTRAUTERINE) CONTRACEPTIVE DEVICE: ICD-10-CM

## 2022-10-26 PROCEDURE — 81003 URINALYSIS AUTO W/O SCOPE: CPT | Mod: QW

## 2022-10-26 PROCEDURE — 81025 URINE PREGNANCY TEST: CPT

## 2022-10-26 PROCEDURE — 58301 REMOVE INTRAUTERINE DEVICE: CPT

## 2022-10-26 NOTE — PHYSICAL EXAM
[Chaperone Present] : A chaperone was present in the examining room during all aspects of the physical examination [FreeTextEntry1] : ESTRELLA NAYAK  [Labia Majora] : normal [Labia Minora] : normal [Normal] : normal [Uterine Adnexae] : normal

## 2022-10-26 NOTE — HISTORY OF PRESENT ILLNESS
[FreeTextEntry1] : PT COMES IN FOR PARAGARD REMOVAL\par WANTS TO HAVE MORE CHILDREN\par  NO COMPLAINTS CARE UP TO DATE \par NO SOB, NO CP, FULL ROM, NO DYSURIA \par NO MEDS\par URINE DIP NEG FOR UTI\par  URINE DIP NEG FOR PEG

## 2022-10-26 NOTE — COUNSELING
[Preconception Care/ Fertility options] : preconception care, fertility options [Pregnancy Options] : pregnancy options [Lab Results] : lab results [Medication Management] : medication management

## 2022-10-28 LAB
BILIRUB UR QL STRIP: NORMAL
GLUCOSE UR-MCNC: NORMAL
HCG UR QL: 1 EU/DL
HCG UR QL: NEGATIVE
HGB UR QL STRIP.AUTO: NORMAL
KETONES UR-MCNC: NORMAL
LEUKOCYTE ESTERASE UR QL STRIP: NORMAL
NITRITE UR QL STRIP: NORMAL
PH UR STRIP: 7.5
PROT UR STRIP-MCNC: NORMAL
QUALITY CONTROL: YES
SP GR UR STRIP: 1.02

## 2022-11-01 LAB — ACTINOMYCES CULTURE FOR IUD: NORMAL

## 2022-12-28 NOTE — OB PROVIDER DELIVERY SUMMARY - AMNIOTIC FLUID COLOR, LABOR
2022  EMPLOYEE INFORMATION: EMPLOYER INFORMATION:   NAME: Alyson LAWTON STAFFING   : 1975 [phone]    DATE OF INJURY/EVENT: 2022           Treating Provider: Palmira Agrawal MD  Time In:  7:17 PM Time Out:  8:23 PM      DIAGNOSIS:   1. Sprain of left wrist, initial encounter    2. Wrist injury, left, initial encounter        RETURN TO WORK:Employee may return to work with restrictions.   Return Date: 2022            RESTRICTIONS: Restrictions are to be followed at work and at home.  Restrictions are in effect until next follow-up visit.  No lifting with left hand, No twisting movement with left wrist     TREATMENT PLAN:   Medications for this injury/condition: Naproxen 500 mg twice daily with food.  Cock-up wrist splint applied by nurse.  Patient tolerated the procedure.  Patient reported that his splint is helping for pain.  Referral/Consult: None  Diagnostic Testing: XR WRIST 3 OR MORE VIEWS LEFT   Drug test completed.  Breath alcohol test completed.      Instructions: None     NEXT RETURN VISIT:   2022.  Thank you for the privilege of providing medical care for this injury/condition.  If there are any questions, please call the occupational health clinic at Dept: [phone].      Electronically signed on 2022 at 8:23 PM by:   Palmira Agrawal MD   [facility]    
clear

## 2023-04-12 NOTE — OB RN PATIENT PROFILE - NS_SOURCEOFINFO_OBGYN_ALL_OB
Patient Siliq Counseling:  I discussed with the patient the risks of Siliq including but not limited to new or worsening depression, suicidal thoughts and behavior, immunosuppression, malignancy, posterior leukoencephalopathy syndrome, and serious infections.  The patient understands that monitoring is required including a PPD at baseline and must alert us or the primary physician if symptoms of infection or other concerning signs are noted. There is also a special program designed to monitor depression which is required with Siliq.

## 2023-06-29 ENCOUNTER — APPOINTMENT (OUTPATIENT)
Dept: OBGYN | Facility: CLINIC | Age: 33
End: 2023-06-29
Payer: MEDICAID

## 2023-06-29 VITALS — WEIGHT: 158 LBS | SYSTOLIC BLOOD PRESSURE: 134 MMHG | DIASTOLIC BLOOD PRESSURE: 82 MMHG | BODY MASS INDEX: 26.29 KG/M2

## 2023-06-29 DIAGNOSIS — Z32.01 ENCOUNTER FOR PREGNANCY TEST, RESULT POSITIVE: ICD-10-CM

## 2023-06-29 LAB
BILIRUB UR QL STRIP: NORMAL
GLUCOSE UR-MCNC: NORMAL
HCG UR QL: 0.2 EU/DL
HCG UR QL: POSITIVE
HGB UR QL STRIP.AUTO: NORMAL
KETONES UR-MCNC: NORMAL
LEUKOCYTE ESTERASE UR QL STRIP: NORMAL
NITRITE UR QL STRIP: NORMAL
PH UR STRIP: 6.5
PROT UR STRIP-MCNC: NORMAL
SP GR UR STRIP: 1.02

## 2023-06-29 PROCEDURE — 81025 URINE PREGNANCY TEST: CPT

## 2023-06-29 PROCEDURE — 99213 OFFICE O/P EST LOW 20 MIN: CPT | Mod: 25

## 2023-06-29 PROCEDURE — 81003 URINALYSIS AUTO W/O SCOPE: CPT | Mod: QW

## 2023-06-29 PROCEDURE — 76817 TRANSVAGINAL US OBSTETRIC: CPT

## 2023-07-06 NOTE — HISTORY OF PRESENT ILLNESS
[FreeTextEntry1] : , lmp 5/20/23, here for confirmation of pregnancy , annual exam was 10/22. \par \par Ob/Gyn Hx- Nsd x 5, largest 9-5, 1 sab, 1 presumed ectopic rx MTX, Pap 2022\par Med/Surg hx - n/c  [Patient reported PAP Smear was normal] : Patient reported PAP Smear was normal [PapSmeardate] : 2002

## 2023-07-06 NOTE — PROCEDURE
[Transvaginal OB Sonogram] : Transvaginal OB Sonogram [Intrauterine Pregnancy] : intrauterine pregnancy [Yolk Sac] : yolk sac present [Transvaginal OB Sonogram WNL] : Transvaginal OB Sonogram WNL [FreeTextEntry1] : Clear IUG seen in EE w/ yolk sac , R ovary w/ CL cyst 2.6 x 2.2 cm, L ov nl, no ff.

## 2023-07-06 NOTE — PLAN
[FreeTextEntry1] : Early IUG seen on ultrasound. \par -follow 2 wks \par -rpt sono \par -precautions reviewed \par -pn labs next visit

## 2023-07-13 ENCOUNTER — LABORATORY RESULT (OUTPATIENT)
Age: 33
End: 2023-07-13

## 2023-07-14 ENCOUNTER — APPOINTMENT (OUTPATIENT)
Dept: OBGYN | Facility: CLINIC | Age: 33
End: 2023-07-14
Payer: MEDICAID

## 2023-07-14 VITALS — BODY MASS INDEX: 26.96 KG/M2 | SYSTOLIC BLOOD PRESSURE: 123 MMHG | DIASTOLIC BLOOD PRESSURE: 76 MMHG | WEIGHT: 162 LBS

## 2023-07-14 LAB
BILIRUB UR QL STRIP: NORMAL
GLUCOSE UR-MCNC: NORMAL
HCG UR QL: 0.2 EU/DL
HGB UR QL STRIP.AUTO: NORMAL
KETONES UR-MCNC: NORMAL
LEUKOCYTE ESTERASE UR QL STRIP: NORMAL
NITRITE UR QL STRIP: NORMAL
PH UR STRIP: 6
PROT UR STRIP-MCNC: NORMAL
SP GR UR STRIP: 1.02

## 2023-07-14 PROCEDURE — 76817 TRANSVAGINAL US OBSTETRIC: CPT

## 2023-07-14 PROCEDURE — 0501F PRENATAL FLOW SHEET: CPT

## 2023-07-14 PROCEDURE — 36415 COLL VENOUS BLD VENIPUNCTURE: CPT

## 2023-08-08 ENCOUNTER — APPOINTMENT (OUTPATIENT)
Dept: OBGYN | Facility: CLINIC | Age: 33
End: 2023-08-08
Payer: MEDICAID

## 2023-08-08 VITALS
DIASTOLIC BLOOD PRESSURE: 76 MMHG | SYSTOLIC BLOOD PRESSURE: 124 MMHG | BODY MASS INDEX: 27.49 KG/M2 | WEIGHT: 165 LBS | HEIGHT: 65 IN

## 2023-08-08 LAB
BILIRUB UR QL STRIP: NORMAL
GLUCOSE UR-MCNC: NORMAL
HCG UR QL: 0.2 EU/DL
HGB UR QL STRIP.AUTO: NORMAL
KETONES UR-MCNC: NORMAL
LEUKOCYTE ESTERASE UR QL STRIP: NORMAL
NITRITE UR QL STRIP: NORMAL
PH UR STRIP: 7.5
PROT UR STRIP-MCNC: NORMAL
SP GR UR STRIP: 1.01

## 2023-08-08 PROCEDURE — 81003 URINALYSIS AUTO W/O SCOPE: CPT | Mod: NC,QW

## 2023-08-08 PROCEDURE — 0502F SUBSEQUENT PRENATAL CARE: CPT

## 2023-09-06 ENCOUNTER — APPOINTMENT (OUTPATIENT)
Dept: OBGYN | Facility: CLINIC | Age: 33
End: 2023-09-06
Payer: MEDICAID

## 2023-09-06 VITALS — BODY MASS INDEX: 27.46 KG/M2 | WEIGHT: 165 LBS | DIASTOLIC BLOOD PRESSURE: 83 MMHG | SYSTOLIC BLOOD PRESSURE: 128 MMHG

## 2023-09-06 PROCEDURE — 0502F SUBSEQUENT PRENATAL CARE: CPT

## 2023-09-11 ENCOUNTER — APPOINTMENT (OUTPATIENT)
Dept: OBGYN | Facility: CLINIC | Age: 33
End: 2023-09-11
Payer: MEDICAID

## 2023-09-11 ENCOUNTER — RESULT CHARGE (OUTPATIENT)
Age: 33
End: 2023-09-11

## 2023-09-11 PROCEDURE — 81003 URINALYSIS AUTO W/O SCOPE: CPT | Mod: QW

## 2023-09-12 LAB
BILIRUB UR QL STRIP: NORMAL
GLUCOSE UR-MCNC: NORMAL
HCG UR QL: 0.2 EU/DL
HGB UR QL STRIP.AUTO: NORMAL
KETONES UR-MCNC: NORMAL
LEUKOCYTE ESTERASE UR QL STRIP: NORMAL
NITRITE UR QL STRIP: NORMAL
PH UR STRIP: 5
PROT UR STRIP-MCNC: NORMAL
SP GR UR STRIP: >=1.03

## 2023-09-29 ENCOUNTER — APPOINTMENT (OUTPATIENT)
Dept: OBGYN | Facility: CLINIC | Age: 33
End: 2023-09-29

## 2023-09-29 ENCOUNTER — NON-APPOINTMENT (OUTPATIENT)
Age: 33
End: 2023-09-29

## 2023-10-02 ENCOUNTER — APPOINTMENT (OUTPATIENT)
Dept: OBGYN | Facility: CLINIC | Age: 33
End: 2023-10-02
Payer: MEDICAID

## 2023-10-02 VITALS — SYSTOLIC BLOOD PRESSURE: 138 MMHG | WEIGHT: 168 LBS | DIASTOLIC BLOOD PRESSURE: 82 MMHG | BODY MASS INDEX: 27.96 KG/M2

## 2023-10-02 PROCEDURE — 0502F SUBSEQUENT PRENATAL CARE: CPT

## 2023-10-12 ENCOUNTER — NON-APPOINTMENT (OUTPATIENT)
Age: 33
End: 2023-10-12

## 2023-10-12 ENCOUNTER — APPOINTMENT (OUTPATIENT)
Dept: ANTEPARTUM | Facility: CLINIC | Age: 33
End: 2023-10-12
Payer: MEDICAID

## 2023-10-12 ENCOUNTER — APPOINTMENT (OUTPATIENT)
Dept: OBGYN | Facility: CLINIC | Age: 33
End: 2023-10-12
Payer: MEDICAID

## 2023-10-12 ENCOUNTER — ASOB RESULT (OUTPATIENT)
Age: 33
End: 2023-10-12

## 2023-10-12 VITALS — DIASTOLIC BLOOD PRESSURE: 83 MMHG | BODY MASS INDEX: 28.62 KG/M2 | WEIGHT: 172 LBS | SYSTOLIC BLOOD PRESSURE: 137 MMHG

## 2023-10-12 VITALS — DIASTOLIC BLOOD PRESSURE: 75 MMHG | SYSTOLIC BLOOD PRESSURE: 111 MMHG

## 2023-10-12 LAB
BILIRUB UR QL STRIP: NORMAL
GLUCOSE UR-MCNC: NORMAL
HCG UR QL: 0.2 EU/DL
HGB UR QL STRIP.AUTO: NORMAL
KETONES UR-MCNC: NORMAL
LEUKOCYTE ESTERASE UR QL STRIP: NORMAL
NITRITE UR QL STRIP: NORMAL
PH UR STRIP: 6.5
PROT UR STRIP-MCNC: NORMAL
SP GR UR STRIP: 1.02

## 2023-10-12 PROCEDURE — 76811 OB US DETAILED SNGL FETUS: CPT

## 2023-10-12 PROCEDURE — 81003 URINALYSIS AUTO W/O SCOPE: CPT | Mod: NC,QW

## 2023-10-12 PROCEDURE — 0502F SUBSEQUENT PRENATAL CARE: CPT

## 2023-10-17 ENCOUNTER — NON-APPOINTMENT (OUTPATIENT)
Age: 33
End: 2023-10-17

## 2023-11-08 ENCOUNTER — APPOINTMENT (OUTPATIENT)
Dept: OBGYN | Facility: CLINIC | Age: 33
End: 2023-11-08
Payer: MEDICAID

## 2023-11-08 VITALS — BODY MASS INDEX: 28.96 KG/M2 | SYSTOLIC BLOOD PRESSURE: 120 MMHG | DIASTOLIC BLOOD PRESSURE: 78 MMHG | WEIGHT: 174 LBS

## 2023-11-08 LAB
BILIRUB UR QL STRIP: NORMAL
GLUCOSE UR-MCNC: NORMAL
HCG UR QL: 0.2 EU/DL
HGB UR QL STRIP.AUTO: NORMAL
KETONES UR-MCNC: NORMAL
LEUKOCYTE ESTERASE UR QL STRIP: NORMAL
NITRITE UR QL STRIP: NORMAL
PH UR STRIP: 5.5
PROT UR STRIP-MCNC: NORMAL
SP GR UR STRIP: 1.02

## 2023-11-08 PROCEDURE — 81002 URINALYSIS NONAUTO W/O SCOPE: CPT | Mod: NC

## 2023-11-08 PROCEDURE — 0502F SUBSEQUENT PRENATAL CARE: CPT

## 2023-11-08 PROCEDURE — 36415 COLL VENOUS BLD VENIPUNCTURE: CPT

## 2023-11-09 LAB
BASOPHILS # BLD AUTO: 0.05 K/UL
BASOPHILS NFR BLD AUTO: 0.4 %
EOSINOPHIL # BLD AUTO: 0.25 K/UL
EOSINOPHIL NFR BLD AUTO: 2.2 %
GLUCOSE 1H P 50 G GLC PO SERPL-MCNC: 101 MG/DL
HCT VFR BLD CALC: 37.2 %
HGB BLD-MCNC: 11.6 G/DL
IMM GRANULOCYTES NFR BLD AUTO: 3.1 %
LYMPHOCYTES # BLD AUTO: 2.09 K/UL
LYMPHOCYTES NFR BLD AUTO: 18.3 %
MAN DIFF?: NORMAL
MCHC RBC-ENTMCNC: 30.3 PG
MCHC RBC-ENTMCNC: 31.2 GM/DL
MCV RBC AUTO: 97.1 FL
MONOCYTES # BLD AUTO: 0.89 K/UL
MONOCYTES NFR BLD AUTO: 7.8 %
NEUTROPHILS # BLD AUTO: 7.79 K/UL
NEUTROPHILS NFR BLD AUTO: 68.2 %
PLATELET # BLD AUTO: 306 K/UL
RBC # BLD: 3.83 M/UL
RBC # FLD: 13.9 %
WBC # FLD AUTO: 11.42 K/UL

## 2023-12-06 ENCOUNTER — NON-APPOINTMENT (OUTPATIENT)
Age: 33
End: 2023-12-06

## 2023-12-07 ENCOUNTER — APPOINTMENT (OUTPATIENT)
Dept: OBGYN | Facility: CLINIC | Age: 33
End: 2023-12-07
Payer: MEDICAID

## 2023-12-07 ENCOUNTER — NON-APPOINTMENT (OUTPATIENT)
Age: 33
End: 2023-12-07

## 2023-12-07 VITALS — SYSTOLIC BLOOD PRESSURE: 125 MMHG | WEIGHT: 180 LBS | DIASTOLIC BLOOD PRESSURE: 78 MMHG | BODY MASS INDEX: 29.95 KG/M2

## 2023-12-07 DIAGNOSIS — Z30.431 ENCOUNTER FOR ROUTINE CHECKING OF INTRAUTERINE CONTRACEPTIVE DEVICE: ICD-10-CM

## 2023-12-07 LAB
BILIRUB UR QL STRIP: NORMAL
GLUCOSE UR-MCNC: NORMAL
HCG UR QL: 0.2 EU/DL
HGB UR QL STRIP.AUTO: NORMAL
KETONES UR-MCNC: NORMAL
LEUKOCYTE ESTERASE UR QL STRIP: NORMAL
NITRITE UR QL STRIP: NORMAL
PH UR STRIP: 6
PROT UR STRIP-MCNC: NORMAL
SP GR UR STRIP: >=1.03

## 2023-12-07 PROCEDURE — 0502F SUBSEQUENT PRENATAL CARE: CPT

## 2023-12-07 PROCEDURE — 81003 URINALYSIS AUTO W/O SCOPE: CPT | Mod: NC,QW

## 2023-12-07 NOTE — OB RN PATIENT PROFILE - WEIGHT METHOD
SUBJECTIVE: Patient seen and examined bedside by chief resident.    fluconAZOLE IVPB 600 milliGRAM(s) IV Intermittent every 24 hours  heparin   Injectable 5000 Unit(s) SubCutaneous every 8 hours  hydrALAZINE Injectable 40 milliGRAM(s) IV Push every 6 hours  metoprolol tartrate Injectable 5 milliGRAM(s) IV Push every 6 hours    MEDICATIONS  (PRN):  LORazepam   Injectable 0.5 milliGRAM(s) IV Push at bedtime PRN Anxiety  ondansetron Injectable 4 milliGRAM(s) IV Push every 6 hours PRN Nausea and/or Vomiting      I&O's Detail    06 Dec 2023 07:01  -  07 Dec 2023 07:00  --------------------------------------------------------  IN:    Fat Emulsion (Fish Oil &amp; Plant Based) 20% Infusion: 256.8 mL    IV PiggyBack: 300 mL    TPN (Total Parenteral Nutrition): 2089 mL  Total IN: 2645.8 mL    OUT:    Drain (mL): 675 mL    Drain (mL): 25 mL    Drain (mL): 565 mL    Voided (mL): 3100 mL  Total OUT: 4365 mL    Total NET: -1719.2 mL      07 Dec 2023 07:01  -  07 Dec 2023 07:14  --------------------------------------------------------  IN:    TPN (Total Parenteral Nutrition): 110 mL  Total IN: 110 mL    OUT:    Fat Emulsion (Fish Oil &amp; Plant Based) 20% Infusion: 0 mL  Total OUT: 0 mL    Total NET: 110 mL          Vital Signs Last 24 Hrs  T(C): 37.3 (06 Dec 2023 17:00), Max: 37.3 (06 Dec 2023 17:00)  T(F): 99.2 (06 Dec 2023 17:00), Max: 99.2 (06 Dec 2023 17:00)  HR: 100 (07 Dec 2023 07:00) (96 - 107)  BP: 118/59 (07 Dec 2023 07:00) (113/55 - 166/69)  BP(mean): 85 (07 Dec 2023 07:00) (77 - 103)  RR: 20 (07 Dec 2023 07:00) (20 - 38)  SpO2: 93% (07 Dec 2023 07:00) (86% - 96%)    Parameters below as of 07 Dec 2023 08:00  Patient On (Oxygen Delivery Method): room air        General: NAD, resting comfortably in bed  C/V: NSR  Pulm: Nonlabored breathing, no respiratory distress  Abd: soft, NT/ND  Extrem: WWP, no edema, SCDs in place    LABS:                        8.0    10.76 )-----------( 225      ( 07 Dec 2023 05:04 )             25.2     12-07    134<L>  |  97  |  32<H>  ----------------------------<  134<H>  4.4   |  30  |  2.39<H>    Ca    8.4      07 Dec 2023 05:04  Phos  3.5     12-07  Mg     2.2     12-07    TPro  7.8  /  Alb  2.5<L>  /  TBili  6.5<H>  /  DBili  4.2<H>  /  AST  106<H>  /  ALT  62<H>  /  AlkPhos  134<H>  12-07      Urinalysis Basic - ( 07 Dec 2023 05:04 )    Color: x / Appearance: x / SG: x / pH: x  Gluc: 134 mg/dL / Ketone: x  / Bili: x / Urobili: x   Blood: x / Protein: x / Nitrite: x   Leuk Esterase: x / RBC: x / WBC x   Sq Epi: x / Non Sq Epi: x / Bacteria: x        RADIOLOGY & ADDITIONAL STUDIES:     stated

## 2023-12-22 NOTE — OB RN PATIENT PROFILE - BREASTFEEDING PROVIDES MATERNAL HEALTH BENEFITS, DECREASED PREMENOPAUSAL BREAST CANCER, OVARIAN CANCER AND TYPE II DIABETES MELLITUS
"Chief Complaint:    Chief Complaint   Patient presents with    Syphilis follow up       Vital Signs:   /95   Pulse 90   Ht 167.6 cm (65.98\")   Wt 86.4 kg (190 lb 6.4 oz)   BMI 30.75 kg/m²   Body mass index is 30.75 kg/m².      HPI:  Javier Hui is a 46 y.o. male who presents today for follow up    History of Present Illness  Mr. Hui presents to the clinic for follow-up for syphilis.  He was initially seen by me in March 2023 for a genital sore.  He had blood work taken by his primary care provider that came back positive with HSV and he was on valacyclovir.  I did complete a RPR titer in office that came back significantly high at 1-256.  He was positive for syphilis and given 2,400,000 units of penicillin IM.  This was then followed up with several rounds of doxycycline due to recurrent genital sores.  His last RPR in June of this year was significantly decreased to 1-16.  He presents today for follow-up.  Patient reports he has had some minor itching and inflammation of the penis that is improved with Lotrisone cream.  States this occurred roughly 2 to 3 weeks after his last office visit and has resolved.  He denies any new genital sores, penile lesions, penile pain, gross hematuria, or fever/chills.  He is doing well and like repeat blood work today in office      Past Medical History:  History reviewed. No pertinent past medical history.    Current Meds:  Current Outpatient Medications   Medication Sig Dispense Refill    clotrimazole-betamethasone (Lotrisone) 1-0.05 % cream Apply 1 application  topically to the appropriate area as directed 2 (Two) Times a Day. 45 g 3     No current facility-administered medications for this visit.        Allergies:   No Known Allergies     Past Surgical History:  History reviewed. No pertinent surgical history.    Social History:  Social History     Socioeconomic History    Marital status:    Tobacco Use    Smoking status: Never    Smokeless tobacco: Current "     Types: Chew   Vaping Use    Vaping Use: Never used   Substance and Sexual Activity    Alcohol use: Never    Drug use: Never    Sexual activity: Never       Family History:  History reviewed. No pertinent family history.    Review of Systems:  Review of Systems   Constitutional:  Negative for chills, fatigue, fever and unexpected weight change.   Respiratory:  Negative for cough, chest tightness, shortness of breath and wheezing.    Cardiovascular:  Negative for chest pain and leg swelling.   Gastrointestinal:  Negative for abdominal pain, constipation, diarrhea, nausea and vomiting.   Genitourinary:  Negative for difficulty urinating, dysuria, frequency, genital sores, penile swelling, scrotal swelling, testicular pain and urgency.   Musculoskeletal:  Negative for back pain and joint swelling.   Skin:  Negative for rash.   Neurological:  Negative for dizziness and headaches.   Psychiatric/Behavioral:  Negative for confusion and suicidal ideas.        Physical Exam:  Physical Exam  Constitutional:       General: He is not in acute distress.     Appearance: Normal appearance.   HENT:      Head: Normocephalic and atraumatic.      Nose: Nose normal.      Mouth/Throat:      Mouth: Mucous membranes are moist.   Eyes:      Conjunctiva/sclera: Conjunctivae normal.   Cardiovascular:      Rate and Rhythm: Normal rate and regular rhythm.      Pulses: Normal pulses.      Heart sounds: Normal heart sounds.   Pulmonary:      Effort: Pulmonary effort is normal.      Breath sounds: Normal breath sounds.   Abdominal:      General: Bowel sounds are normal.      Palpations: Abdomen is soft.   Musculoskeletal:         General: Normal range of motion.      Cervical back: Normal range of motion.   Skin:     General: Skin is warm.   Neurological:      General: No focal deficit present.      Mental Status: He is alert and oriented to person, place, and time.   Psychiatric:         Mood and Affect: Mood normal.         Behavior:  Behavior normal.         Thought Content: Thought content normal.         Judgment: Judgment normal.             Recent Image (CT and/or KUB):   CT Abdomen and Pelvis: No results found for this or any previous visit.     CT Stone Protocol: No results found for this or any previous visit.     KUB: No results found for this or any previous visit.       Labs:  Brief Urine Lab Results       None          No visits with results within 3 Month(s) from this visit.   Latest known visit with results is:   Office Visit on 06/27/2023   Component Date Value Ref Range Status    RPR 06/27/2023 Reactive (A)  Non-Reactive Final    Glucose 06/27/2023 112 (H)  65 - 99 mg/dL Final    BUN 06/27/2023 5 (L)  6 - 20 mg/dL Final    Creatinine 06/27/2023 0.71 (L)  0.76 - 1.27 mg/dL Final    Sodium 06/27/2023 136  136 - 145 mmol/L Final    Potassium 06/27/2023 4.4  3.5 - 5.2 mmol/L Final    Chloride 06/27/2023 98  98 - 107 mmol/L Final    CO2 06/27/2023 27.0  22.0 - 29.0 mmol/L Final    Calcium 06/27/2023 10.4  8.6 - 10.5 mg/dL Final    Total Protein 06/27/2023 7.6  6.0 - 8.5 g/dL Final    Albumin 06/27/2023 5.2  3.5 - 5.2 g/dL Final    ALT (SGPT) 06/27/2023 19  1 - 41 U/L Final    AST (SGOT) 06/27/2023 18  1 - 40 U/L Final    Alkaline Phosphatase 06/27/2023 45  39 - 117 U/L Final    Total Bilirubin 06/27/2023 0.4  0.0 - 1.2 mg/dL Final    Globulin 06/27/2023 2.4  gm/dL Final    A/G Ratio 06/27/2023 2.2  g/dL Final    BUN/Creatinine Ratio 06/27/2023 7.0  7.0 - 25.0 Final    Anion Gap 06/27/2023 11.0  5.0 - 15.0 mmol/L Final    eGFR 06/27/2023 115.3  >60.0 mL/min/1.73 Final    HIV-1/ HIV-2 06/27/2023 Non-Reactive  Non-Reactive Final    A non-reactive test result does not preclude the possibility of exposure to HIV or infection with HIV. An antibody response to recent exposure may take several months to reach detectable levels.    Treponema pallidum Antibodies 06/27/2023 Reactive (A)  Non Reactive Final    RPR Titer 06/27/2023 Pos 1:16 (A)   Negative Final        Procedure: None  Procedures     I have reviewed and agree with the above PMH, PSH, FMH, social history, medications, allergies, and labs.     Assessment/Plan:   Problem List Items Addressed This Visit          Genitourinary and Reproductive     Genital sore    Relevant Medications    clotrimazole-betamethasone (Lotrisone) 1-0.05 % cream    Other Relevant Orders    RPR       Other    Syphilis - Primary       Health Maintenance:   Health Maintenance Due   Topic Date Due    BMI FOLLOWUP  Never done    COLORECTAL CANCER SCREENING  Never done    COVID-19 Vaccine (1) Never done    TDAP/TD VACCINES (1 - Tdap) Never done    HEPATITIS C SCREENING  Never done    ANNUAL PHYSICAL  Never done    INFLUENZA VACCINE  Never done        Smoking Counseling: Never smoked.  Current user of smokeless tobacco.  Counseling given however not ready to quit at this time    Urine Incontinence: Patient reports that he is not currently experiencing any symptoms of urinary incontinence.    Patient was given instructions and counseling regarding his condition or for health maintenance advice. Please see specific information pulled into the AVS if appropriate.    Patient Education:  Syphilis -discussed with the patient pathophysiology of this condition.  Discussed risks including unprotected sexual activity.  Advised patient patient's last RPR was 1-16 and he would like a repeat blood test in office today.  Will obtain a sample and call the patient with results once obtained.  Advised patient I suspect RPR will be much less in office today.  He has had no significant worsening of genital sores or symptoms at this time.  Genital sore - patient does have intermittent balanitis and had improvement with Lotrisone cream.  We will send in a refill to his local pharmacy to apply to the affected area 2 times a day as needed.  Otherwise I will see the patient back on an as-needed basis pending RPR.  Patient verbalized understanding and  agreed to plan of care.    Visit Diagnoses:    ICD-10-CM ICD-9-CM   1. Syphilis  A53.9 097.9   2. Genital sore  R39.89 789.9       Meds Ordered During Visit:  New Medications Ordered This Visit   Medications    clotrimazole-betamethasone (Lotrisone) 1-0.05 % cream     Sig: Apply 1 application  topically to the appropriate area as directed 2 (Two) Times a Day.     Dispense:  45 g     Refill:  3       Follow Up Appointment: As needed  No follow-ups on file.      This document has been electronically signed by Kaleb Knight PA-C   December 22, 2023 13:12 EST    Part of this note may be an electronic transcription/translation of spoken language to printed text using the Dragon Dictation System.   Statement Selected

## 2023-12-28 ENCOUNTER — APPOINTMENT (OUTPATIENT)
Dept: OBGYN | Facility: CLINIC | Age: 33
End: 2023-12-28
Payer: MEDICAID

## 2023-12-28 VITALS — SYSTOLIC BLOOD PRESSURE: 126 MMHG | DIASTOLIC BLOOD PRESSURE: 75 MMHG | BODY MASS INDEX: 30.12 KG/M2 | WEIGHT: 181 LBS

## 2023-12-28 LAB
BILIRUB UR QL STRIP: NORMAL
GLUCOSE UR-MCNC: NORMAL
HCG UR QL: 0.2 EU/DL
HGB UR QL STRIP.AUTO: NORMAL
KETONES UR-MCNC: NORMAL
LEUKOCYTE ESTERASE UR QL STRIP: NORMAL
NITRITE UR QL STRIP: NORMAL
PH UR STRIP: 6
PROT UR STRIP-MCNC: NORMAL
SP GR UR STRIP: 1.01

## 2023-12-28 PROCEDURE — 76816 OB US FOLLOW-UP PER FETUS: CPT

## 2023-12-28 PROCEDURE — 0502F SUBSEQUENT PRENATAL CARE: CPT

## 2023-12-28 PROCEDURE — 81003 URINALYSIS AUTO W/O SCOPE: CPT | Mod: NC,QW

## 2024-01-01 ENCOUNTER — NON-APPOINTMENT (OUTPATIENT)
Age: 34
End: 2024-01-01

## 2024-01-18 ENCOUNTER — APPOINTMENT (OUTPATIENT)
Dept: OBGYN | Facility: CLINIC | Age: 34
End: 2024-01-18
Payer: MEDICAID

## 2024-01-18 VITALS — BODY MASS INDEX: 30.95 KG/M2 | SYSTOLIC BLOOD PRESSURE: 128 MMHG | WEIGHT: 186 LBS | DIASTOLIC BLOOD PRESSURE: 82 MMHG

## 2024-01-18 LAB
BILIRUB UR QL STRIP: NORMAL
GLUCOSE UR-MCNC: NORMAL
HCG UR QL: 0.2 EU/DL
HGB UR QL STRIP.AUTO: NORMAL
KETONES UR-MCNC: NORMAL
LEUKOCYTE ESTERASE UR QL STRIP: NORMAL
NITRITE UR QL STRIP: NORMAL
PH UR STRIP: 5.5
PROT UR STRIP-MCNC: NORMAL
SP GR UR STRIP: >=1.03

## 2024-01-18 PROCEDURE — 0502F SUBSEQUENT PRENATAL CARE: CPT

## 2024-01-18 PROCEDURE — 81003 URINALYSIS AUTO W/O SCOPE: CPT | Mod: NC,QW

## 2024-01-29 ENCOUNTER — APPOINTMENT (OUTPATIENT)
Dept: OBGYN | Facility: CLINIC | Age: 34
End: 2024-01-29
Payer: MEDICAID

## 2024-01-29 VITALS — DIASTOLIC BLOOD PRESSURE: 83 MMHG | BODY MASS INDEX: 31.29 KG/M2 | WEIGHT: 188 LBS | SYSTOLIC BLOOD PRESSURE: 120 MMHG

## 2024-01-29 LAB
BILIRUB UR QL STRIP: NORMAL
GLUCOSE UR-MCNC: NORMAL
HCG UR QL: 2 EU/DL
HGB UR QL STRIP.AUTO: NORMAL
KETONES UR-MCNC: NORMAL
LEUKOCYTE ESTERASE UR QL STRIP: NORMAL
NITRITE UR QL STRIP: NORMAL
PH UR STRIP: 5.5
PROT UR STRIP-MCNC: NORMAL
SP GR UR STRIP: >=1.03

## 2024-01-29 PROCEDURE — 0502F SUBSEQUENT PRENATAL CARE: CPT

## 2024-01-29 PROCEDURE — 36415 COLL VENOUS BLD VENIPUNCTURE: CPT

## 2024-01-29 PROCEDURE — 76816 OB US FOLLOW-UP PER FETUS: CPT

## 2024-01-29 PROCEDURE — 81003 URINALYSIS AUTO W/O SCOPE: CPT | Mod: NC,QW

## 2024-02-03 LAB
HCT VFR BLD CALC: 33.5 %
HGB BLD-MCNC: 10.8 G/DL
HIV1+2 AB SPEC QL IA.RAPID: NONREACTIVE
MCHC RBC-ENTMCNC: 30.3 PG
MCHC RBC-ENTMCNC: 32.2 GM/DL
MCV RBC AUTO: 93.8 FL
PLATELET # BLD AUTO: 236 K/UL
RBC # BLD: 3.57 M/UL
RBC # FLD: 13.9 %
T PALLIDUM AB SER QL IA: NEGATIVE
WBC # FLD AUTO: 10.72 K/UL

## 2024-02-07 ENCOUNTER — APPOINTMENT (OUTPATIENT)
Dept: OBGYN | Facility: CLINIC | Age: 34
End: 2024-02-07
Payer: MEDICAID

## 2024-02-07 VITALS — BODY MASS INDEX: 25.79 KG/M2 | DIASTOLIC BLOOD PRESSURE: 74 MMHG | SYSTOLIC BLOOD PRESSURE: 133 MMHG | WEIGHT: 155 LBS

## 2024-02-07 VITALS — SYSTOLIC BLOOD PRESSURE: 117 MMHG | DIASTOLIC BLOOD PRESSURE: 76 MMHG | BODY MASS INDEX: 31.29 KG/M2 | WEIGHT: 188 LBS

## 2024-02-07 LAB
BILIRUB UR QL STRIP: NORMAL
GLUCOSE UR-MCNC: NORMAL
HCG UR QL: 1 EU/DL
HGB UR QL STRIP.AUTO: NORMAL
KETONES UR-MCNC: NORMAL
LEUKOCYTE ESTERASE UR QL STRIP: NORMAL
NITRITE UR QL STRIP: NORMAL
PH UR STRIP: 6
PROT UR STRIP-MCNC: NORMAL
SP GR UR STRIP: 1.02

## 2024-02-07 PROCEDURE — 81003 URINALYSIS AUTO W/O SCOPE: CPT | Mod: NC,QW

## 2024-02-07 PROCEDURE — 0502F SUBSEQUENT PRENATAL CARE: CPT

## 2024-02-11 ENCOUNTER — NON-APPOINTMENT (OUTPATIENT)
Age: 34
End: 2024-02-11

## 2024-02-16 ENCOUNTER — APPOINTMENT (OUTPATIENT)
Dept: OBGYN | Facility: CLINIC | Age: 34
End: 2024-02-16
Payer: MEDICAID

## 2024-02-16 VITALS — WEIGHT: 188 LBS | BODY MASS INDEX: 31.29 KG/M2 | DIASTOLIC BLOOD PRESSURE: 83 MMHG | SYSTOLIC BLOOD PRESSURE: 122 MMHG

## 2024-02-16 PROCEDURE — 81003 URINALYSIS AUTO W/O SCOPE: CPT | Mod: NC,QW

## 2024-02-16 PROCEDURE — 0502F SUBSEQUENT PRENATAL CARE: CPT

## 2024-02-21 ENCOUNTER — APPOINTMENT (OUTPATIENT)
Dept: OBGYN | Facility: CLINIC | Age: 34
End: 2024-02-21
Payer: MEDICAID

## 2024-02-21 VITALS — WEIGHT: 188 LBS | DIASTOLIC BLOOD PRESSURE: 84 MMHG | SYSTOLIC BLOOD PRESSURE: 129 MMHG | BODY MASS INDEX: 31.29 KG/M2

## 2024-02-21 PROCEDURE — 81003 URINALYSIS AUTO W/O SCOPE: CPT | Mod: NC,QW

## 2024-02-21 PROCEDURE — 76816 OB US FOLLOW-UP PER FETUS: CPT

## 2024-02-21 PROCEDURE — 0502F SUBSEQUENT PRENATAL CARE: CPT

## 2024-02-26 ENCOUNTER — APPOINTMENT (OUTPATIENT)
Dept: OBGYN | Facility: CLINIC | Age: 34
End: 2024-02-26
Payer: MEDICAID

## 2024-02-26 VITALS — SYSTOLIC BLOOD PRESSURE: 128 MMHG | DIASTOLIC BLOOD PRESSURE: 85 MMHG

## 2024-02-26 VITALS — WEIGHT: 188 LBS | DIASTOLIC BLOOD PRESSURE: 86 MMHG | BODY MASS INDEX: 31.29 KG/M2 | SYSTOLIC BLOOD PRESSURE: 133 MMHG

## 2024-02-26 PROCEDURE — 0502F SUBSEQUENT PRENATAL CARE: CPT

## 2024-02-26 PROCEDURE — 81003 URINALYSIS AUTO W/O SCOPE: CPT | Mod: NC,QW

## 2024-02-26 PROCEDURE — 76815 OB US LIMITED FETUS(S): CPT

## 2024-02-26 PROCEDURE — 59025 FETAL NON-STRESS TEST: CPT | Mod: 59

## 2024-02-28 ENCOUNTER — APPOINTMENT (OUTPATIENT)
Dept: OBGYN | Facility: CLINIC | Age: 34
End: 2024-02-28
Payer: MEDICAID

## 2024-02-28 VITALS — DIASTOLIC BLOOD PRESSURE: 82 MMHG | SYSTOLIC BLOOD PRESSURE: 130 MMHG | BODY MASS INDEX: 31.12 KG/M2 | WEIGHT: 187 LBS

## 2024-02-28 DIAGNOSIS — Z34.90 ENCOUNTER FOR SUPERVISION OF NORMAL PREGNANCY, UNSPECIFIED, UNSPECIFIED TRIMESTER: ICD-10-CM

## 2024-02-28 LAB
BILIRUB UR QL STRIP: NORMAL
GLUCOSE UR-MCNC: NORMAL
HCG UR QL: 0.2 EU/DL
HGB UR QL STRIP.AUTO: NORMAL
KETONES UR-MCNC: NORMAL
LEUKOCYTE ESTERASE UR QL STRIP: NORMAL
NITRITE UR QL STRIP: NORMAL
PH UR STRIP: 6
PROT UR STRIP-MCNC: NORMAL
SP GR UR STRIP: 1.03

## 2024-02-28 PROCEDURE — 0502F SUBSEQUENT PRENATAL CARE: CPT

## 2024-02-28 PROCEDURE — 81003 URINALYSIS AUTO W/O SCOPE: CPT | Mod: NC,QW

## 2024-02-28 PROCEDURE — 76818 FETAL BIOPHYS PROFILE W/NST: CPT

## 2024-02-29 ENCOUNTER — INPATIENT (INPATIENT)
Facility: HOSPITAL | Age: 34
LOS: 2 days | Discharge: HOME | DRG: 560 | End: 2024-03-03
Attending: OBSTETRICS & GYNECOLOGY | Admitting: OBSTETRICS & GYNECOLOGY
Payer: MEDICAID

## 2024-02-29 ENCOUNTER — NON-APPOINTMENT (OUTPATIENT)
Age: 34
End: 2024-02-29

## 2024-02-29 VITALS — HEIGHT: 65 IN | WEIGHT: 186.95 LBS

## 2024-02-29 DIAGNOSIS — O26.893 OTHER SPECIFIED PREGNANCY RELATED CONDITIONS, THIRD TRIMESTER: ICD-10-CM

## 2024-02-29 DIAGNOSIS — Z98.890 OTHER SPECIFIED POSTPROCEDURAL STATES: Chronic | ICD-10-CM

## 2024-02-29 LAB
APPEARANCE UR: ABNORMAL
BASOPHILS # BLD AUTO: 0.04 K/UL — SIGNIFICANT CHANGE UP (ref 0–0.2)
BASOPHILS NFR BLD AUTO: 0.5 % — SIGNIFICANT CHANGE UP (ref 0–1)
BILIRUB UR-MCNC: NEGATIVE — SIGNIFICANT CHANGE UP
COLOR SPEC: YELLOW — SIGNIFICANT CHANGE UP
DIFF PNL FLD: ABNORMAL
EOSINOPHIL # BLD AUTO: 0.15 K/UL — SIGNIFICANT CHANGE UP (ref 0–0.7)
EOSINOPHIL NFR BLD AUTO: 1.7 % — SIGNIFICANT CHANGE UP (ref 0–8)
GLUCOSE UR QL: NEGATIVE MG/DL — SIGNIFICANT CHANGE UP
HCT VFR BLD CALC: 35.2 % — LOW (ref 37–47)
HGB BLD-MCNC: 12.3 G/DL — SIGNIFICANT CHANGE UP (ref 12–16)
IMM GRANULOCYTES NFR BLD AUTO: 1.7 % — HIGH (ref 0.1–0.3)
KETONES UR-MCNC: 15 MG/DL
L&D DRUG SCREEN, URINE: SIGNIFICANT CHANGE UP
LEUKOCYTE ESTERASE UR-ACNC: ABNORMAL
LYMPHOCYTES # BLD AUTO: 1.84 K/UL — SIGNIFICANT CHANGE UP (ref 1.2–3.4)
LYMPHOCYTES # BLD AUTO: 20.8 % — SIGNIFICANT CHANGE UP (ref 20.5–51.1)
MCHC RBC-ENTMCNC: 31.1 PG — HIGH (ref 27–31)
MCHC RBC-ENTMCNC: 34.9 G/DL — SIGNIFICANT CHANGE UP (ref 32–37)
MCV RBC AUTO: 88.9 FL — SIGNIFICANT CHANGE UP (ref 81–99)
MONOCYTES # BLD AUTO: 1 K/UL — HIGH (ref 0.1–0.6)
MONOCYTES NFR BLD AUTO: 11.3 % — HIGH (ref 1.7–9.3)
NEUTROPHILS # BLD AUTO: 5.68 K/UL — SIGNIFICANT CHANGE UP (ref 1.4–6.5)
NEUTROPHILS NFR BLD AUTO: 64 % — SIGNIFICANT CHANGE UP (ref 42.2–75.2)
NITRITE UR-MCNC: NEGATIVE — SIGNIFICANT CHANGE UP
NRBC # BLD: 0 /100 WBCS — SIGNIFICANT CHANGE UP (ref 0–0)
PH UR: 5.5 — SIGNIFICANT CHANGE UP (ref 5–8)
PLATELET # BLD AUTO: 198 K/UL — SIGNIFICANT CHANGE UP (ref 130–400)
PMV BLD: 11.9 FL — HIGH (ref 7.4–10.4)
PRENATAL SYPHILIS TEST: SIGNIFICANT CHANGE UP
PROT UR-MCNC: SIGNIFICANT CHANGE UP MG/DL
RBC # BLD: 3.96 M/UL — LOW (ref 4.2–5.4)
RBC # FLD: 13.2 % — SIGNIFICANT CHANGE UP (ref 11.5–14.5)
SP GR SPEC: 1.02 — SIGNIFICANT CHANGE UP (ref 1–1.03)
UROBILINOGEN FLD QL: 1 MG/DL — SIGNIFICANT CHANGE UP (ref 0.2–1)
WBC # BLD: 8.86 K/UL — SIGNIFICANT CHANGE UP (ref 4.8–10.8)
WBC # FLD AUTO: 8.86 K/UL — SIGNIFICANT CHANGE UP (ref 4.8–10.8)

## 2024-02-29 PROCEDURE — 97162 PT EVAL MOD COMPLEX 30 MIN: CPT | Mod: GP

## 2024-02-29 PROCEDURE — 80354 DRUG SCREENING FENTANYL: CPT

## 2024-02-29 PROCEDURE — 72170 X-RAY EXAM OF PELVIS: CPT

## 2024-02-29 PROCEDURE — 86900 BLOOD TYPING SEROLOGIC ABO: CPT

## 2024-02-29 PROCEDURE — 85384 FIBRINOGEN ACTIVITY: CPT

## 2024-02-29 PROCEDURE — 86850 RBC ANTIBODY SCREEN: CPT

## 2024-02-29 PROCEDURE — 59400 OBSTETRICAL CARE: CPT | Mod: U9

## 2024-02-29 PROCEDURE — 81001 URINALYSIS AUTO W/SCOPE: CPT

## 2024-02-29 PROCEDURE — 59050 FETAL MONITOR W/REPORT: CPT

## 2024-02-29 PROCEDURE — 85025 COMPLETE CBC W/AUTO DIFF WBC: CPT

## 2024-02-29 PROCEDURE — 80307 DRUG TEST PRSMV CHEM ANLYZR: CPT

## 2024-02-29 PROCEDURE — 86592 SYPHILIS TEST NON-TREP QUAL: CPT

## 2024-02-29 PROCEDURE — 86901 BLOOD TYPING SEROLOGIC RH(D): CPT

## 2024-02-29 PROCEDURE — 36415 COLL VENOUS BLD VENIPUNCTURE: CPT

## 2024-02-29 PROCEDURE — 85610 PROTHROMBIN TIME: CPT

## 2024-02-29 PROCEDURE — 85730 THROMBOPLASTIN TIME PARTIAL: CPT

## 2024-02-29 RX ORDER — OXYCODONE HYDROCHLORIDE 5 MG/1
5 TABLET ORAL ONCE
Refills: 0 | Status: DISCONTINUED | OUTPATIENT
Start: 2024-02-29 | End: 2024-03-03

## 2024-02-29 RX ORDER — KETOROLAC TROMETHAMINE 30 MG/ML
30 SYRINGE (ML) INJECTION ONCE
Refills: 0 | Status: DISCONTINUED | OUTPATIENT
Start: 2024-02-29 | End: 2024-02-29

## 2024-02-29 RX ORDER — SODIUM CHLORIDE 9 MG/ML
3 INJECTION INTRAMUSCULAR; INTRAVENOUS; SUBCUTANEOUS EVERY 8 HOURS
Refills: 0 | Status: DISCONTINUED | OUTPATIENT
Start: 2024-02-29 | End: 2024-03-03

## 2024-02-29 RX ORDER — AMPICILLIN TRIHYDRATE 250 MG
2 CAPSULE ORAL ONCE
Refills: 0 | Status: COMPLETED | OUTPATIENT
Start: 2024-02-29 | End: 2024-02-29

## 2024-02-29 RX ORDER — HYDROCORTISONE 1 %
1 OINTMENT (GRAM) TOPICAL EVERY 6 HOURS
Refills: 0 | Status: DISCONTINUED | OUTPATIENT
Start: 2024-02-29 | End: 2024-03-03

## 2024-02-29 RX ORDER — OXYTOCIN 10 UNIT/ML
41.67 VIAL (ML) INJECTION
Qty: 20 | Refills: 0 | Status: DISCONTINUED | OUTPATIENT
Start: 2024-02-29 | End: 2024-03-03

## 2024-02-29 RX ORDER — LANOLIN
1 OINTMENT (GRAM) TOPICAL EVERY 6 HOURS
Refills: 0 | Status: DISCONTINUED | OUTPATIENT
Start: 2024-02-29 | End: 2024-03-03

## 2024-02-29 RX ORDER — SIMETHICONE 80 MG/1
80 TABLET, CHEWABLE ORAL EVERY 4 HOURS
Refills: 0 | Status: DISCONTINUED | OUTPATIENT
Start: 2024-02-29 | End: 2024-03-03

## 2024-02-29 RX ORDER — NALOXONE HYDROCHLORIDE 4 MG/.1ML
0.1 SPRAY NASAL
Refills: 0 | Status: DISCONTINUED | OUTPATIENT
Start: 2024-02-29 | End: 2024-03-03

## 2024-02-29 RX ORDER — DIPHENHYDRAMINE HCL 50 MG
25 CAPSULE ORAL EVERY 6 HOURS
Refills: 0 | Status: DISCONTINUED | OUTPATIENT
Start: 2024-02-29 | End: 2024-03-03

## 2024-02-29 RX ORDER — ACETAMINOPHEN 500 MG
975 TABLET ORAL
Refills: 0 | Status: DISCONTINUED | OUTPATIENT
Start: 2024-02-29 | End: 2024-03-03

## 2024-02-29 RX ORDER — FENTANYL/BUPIVACAINE/NS/PF 2MCG/ML-.1
250 PLASTIC BAG, INJECTION (ML) INJECTION
Refills: 0 | Status: DISCONTINUED | OUTPATIENT
Start: 2024-02-29 | End: 2024-02-29

## 2024-02-29 RX ORDER — OXYCODONE HYDROCHLORIDE 5 MG/1
5 TABLET ORAL
Refills: 0 | Status: DISCONTINUED | OUTPATIENT
Start: 2024-02-29 | End: 2024-03-03

## 2024-02-29 RX ORDER — DIBUCAINE 1 %
1 OINTMENT (GRAM) RECTAL EVERY 6 HOURS
Refills: 0 | Status: DISCONTINUED | OUTPATIENT
Start: 2024-02-29 | End: 2024-03-03

## 2024-02-29 RX ORDER — INFLUENZA VIRUS VACCINE 15; 15; 15; 15 UG/.5ML; UG/.5ML; UG/.5ML; UG/.5ML
0.5 SUSPENSION INTRAMUSCULAR ONCE
Refills: 0 | Status: COMPLETED | OUTPATIENT
Start: 2024-02-29 | End: 2024-02-29

## 2024-02-29 RX ORDER — OXYTOCIN 10 UNIT/ML
1 VIAL (ML) INJECTION
Qty: 30 | Refills: 0 | Status: DISCONTINUED | OUTPATIENT
Start: 2024-02-29 | End: 2024-02-29

## 2024-02-29 RX ORDER — AER TRAVELER 0.5 G/1
1 SOLUTION RECTAL; TOPICAL EVERY 4 HOURS
Refills: 0 | Status: DISCONTINUED | OUTPATIENT
Start: 2024-02-29 | End: 2024-03-03

## 2024-02-29 RX ORDER — MAGNESIUM HYDROXIDE 400 MG/1
30 TABLET, CHEWABLE ORAL
Refills: 0 | Status: DISCONTINUED | OUTPATIENT
Start: 2024-02-29 | End: 2024-03-03

## 2024-02-29 RX ORDER — DEXAMETHASONE 0.5 MG/5ML
4 ELIXIR ORAL EVERY 6 HOURS
Refills: 0 | Status: DISCONTINUED | OUTPATIENT
Start: 2024-02-29 | End: 2024-02-29

## 2024-02-29 RX ORDER — SODIUM CHLORIDE 9 MG/ML
1000 INJECTION, SOLUTION INTRAVENOUS
Refills: 0 | Status: DISCONTINUED | OUTPATIENT
Start: 2024-02-29 | End: 2024-02-29

## 2024-02-29 RX ORDER — TETANUS TOXOID, REDUCED DIPHTHERIA TOXOID AND ACELLULAR PERTUSSIS VACCINE, ADSORBED 5; 2.5; 8; 8; 2.5 [IU]/.5ML; [IU]/.5ML; UG/.5ML; UG/.5ML; UG/.5ML
0.5 SUSPENSION INTRAMUSCULAR ONCE
Refills: 0 | Status: DISCONTINUED | OUTPATIENT
Start: 2024-02-29 | End: 2024-03-03

## 2024-02-29 RX ORDER — PRAMOXINE HYDROCHLORIDE 150 MG/15G
1 AEROSOL, FOAM RECTAL EVERY 4 HOURS
Refills: 0 | Status: DISCONTINUED | OUTPATIENT
Start: 2024-02-29 | End: 2024-03-03

## 2024-02-29 RX ORDER — IBUPROFEN 200 MG
600 TABLET ORAL EVERY 6 HOURS
Refills: 0 | Status: COMPLETED | OUTPATIENT
Start: 2024-02-29 | End: 2025-01-27

## 2024-02-29 RX ORDER — ONDANSETRON 8 MG/1
4 TABLET, FILM COATED ORAL EVERY 6 HOURS
Refills: 0 | Status: DISCONTINUED | OUTPATIENT
Start: 2024-02-29 | End: 2024-03-03

## 2024-02-29 RX ORDER — OXYTOCIN 10 UNIT/ML
333.33 VIAL (ML) INJECTION
Qty: 20 | Refills: 0 | Status: DISCONTINUED | OUTPATIENT
Start: 2024-02-29 | End: 2024-02-29

## 2024-02-29 RX ORDER — AMPICILLIN TRIHYDRATE 250 MG
1 CAPSULE ORAL EVERY 4 HOURS
Refills: 0 | Status: DISCONTINUED | OUTPATIENT
Start: 2024-02-29 | End: 2024-02-29

## 2024-02-29 RX ORDER — BENZOCAINE 10 %
1 GEL (GRAM) MUCOUS MEMBRANE EVERY 6 HOURS
Refills: 0 | Status: DISCONTINUED | OUTPATIENT
Start: 2024-02-29 | End: 2024-03-03

## 2024-02-29 RX ADMIN — Medication 108 GRAM(S): at 20:59

## 2024-02-29 RX ADMIN — Medication 108 GRAM(S): at 13:10

## 2024-02-29 RX ADMIN — SODIUM CHLORIDE 125 MILLILITER(S): 9 INJECTION, SOLUTION INTRAVENOUS at 08:56

## 2024-02-29 RX ADMIN — Medication 30 MILLIGRAM(S): at 21:51

## 2024-02-29 RX ADMIN — SODIUM CHLORIDE 3 MILLILITER(S): 9 INJECTION INTRAMUSCULAR; INTRAVENOUS; SUBCUTANEOUS at 22:00

## 2024-02-29 RX ADMIN — Medication 200 GRAM(S): at 08:54

## 2024-02-29 RX ADMIN — Medication 108 GRAM(S): at 17:10

## 2024-02-29 RX ADMIN — Medication 0.2 MILLIGRAM(S): at 23:45

## 2024-02-29 RX ADMIN — Medication 30 MILLIGRAM(S): at 22:30

## 2024-02-29 NOTE — OB PROVIDER H&P - NSTRANFUSIONOBJECTION_GEN_ALL_CORE_SIUH
Patient has no objection to blood transfusions. Is This A New Presentation, Or A Follow-Up?: Follow Up Isotretinoin Display Cumulative Dose In The Note?: Yes Patient Reported Weight In Pounds (Required For Calculation): 120 When Was Isotretinoin Started?: 01/06/2020

## 2024-02-29 NOTE — OB PROVIDER H&P - NS_OBGYNHISTORY_OBGYN_ALL_OB_FT
OB: FT  x5, largest 4220g, no h/o PPH    GYN: denies h/o fibroids, cysts, abnormal paps, or STI's  H/o HSV1, h/o cold sores, no genital lesions

## 2024-02-29 NOTE — PROGRESS NOTE ADULT - ASSESSMENT
33y  at 40w5d, GBS pos, in active labor progressing well.  - current management, continue pitocin  - Cont EFM/[place]  - Clear Liquids & IV Hydration  - Pain Management prn  - Monitor vitals

## 2024-02-29 NOTE — OB RN DELIVERY SUMMARY - BABY A: APGAR 1 MIN COLOR, DELIVERY
PROCEDURE NOTE: Lucentis 0.5mg PFS OD. Diagnosis: Neovascular AMD with Active CNV. Anesthesia: Topical. Prep: Betadine Flush. Prior to injection, risks/benefits/alternatives discussed including but not limited to infection, loss of vision or eye, hemorrhage, cataract, glaucoma, retinal tears or detachment. The patient wished to proceed with treatment. Topical anesthesia was induced with Alcaine. Additional anesthesia was achieved using drop(s) or injection checked above. A drop of Povidone-iodine 5% ophthalmic solution was instilled over the injection site and in the inferior fornix. Betadine prep was performed. A single use prefilled syringe of intravitreal Lucentis 0.5mg/0.05ml was used and excess discarded. The needle was passed 3.0 mm posterior to the limbus in pseudophakic patients, and 3.5 mm posterior to the limbus in phakic patients. The remainder of the Lucentis 0.5mg in the single-use vial was then discarded in a medical waste disposal container. The eye was irrigated with sterile irrigating solution. Patient tolerated the procedure well. There were no complications. Post procedure instructions given. CF vision checked. Injection Time *. The patient was instructed to return for re-evaluation in approximately 4-12 weeks depending on his/her condition and was told to call immediately if vision decreases and/or if his/her eye becomes red, painful, and/or light sensitive. The patient was instructed to go to the emergency room or call 911 if unable to reach the doctor within an hour or two of trying or calling. Neeraj Hernandes PROCEDURE NOTE: Lucentis 0.5mg PFS OS. Diagnosis: Neovascular AMD with Active CNV. Anesthesia: Topical. Prep: Betadine Flush. Prior to injection, risks/benefits/alternatives discussed including but not limited to infection, loss of vision or eye, hemorrhage, cataract, glaucoma, retinal tears or detachment. The patient wished to proceed with treatment. Topical anesthesia was induced with Alcaine. Additional anesthesia was achieved using drop(s) or injection checked above. A drop of Povidone-iodine 5% ophthalmic solution was instilled over the injection site and in the inferior fornix. Betadine prep was performed. A single use prefilled syringe of intravitreal Lucentis 0.5mg/0.05ml was used and excess discarded. The needle was passed 3.0 mm posterior to the limbus in pseudophakic patients, and 3.5 mm posterior to the limbus in phakic patients. The remainder of the Lucentis 0.5mg in the single-use vial was then discarded in a medical waste disposal container. The eye was irrigated with sterile irrigating solution. Patient tolerated the procedure well. There were no complications. Post procedure instructions given. CF vision checked. Injection Time 2:03. The patient was instructed to return for re-evaluation in approximately 4-12 weeks depending on his/her condition and was told to call immediately if vision decreases and/or if his/her eye becomes red, painful, and/or light sensitive. The patient was instructed to go to the emergency room or call 911 if unable to reach the doctor within an hour or two of trying or calling. Neeraj Hernandes
(1) body pink, extremities blue

## 2024-02-29 NOTE — OB PROVIDER H&P - HISTORY OF PRESENT ILLNESS
34 y/o  at 40w5d, YANICK 2024, dated by first trimester sonogram ###### 34 y/o  at 40w5d, YANICK 2024, dated by first trimester sonogram, presents for IOL at term. Irregular contractions. Denies LOF, vaginal bleeding. Good fetal movement. Denies h/o PPH. Not taking any medications. Rh pos, GBS pos.     H/o ectopic pregnancy s/p MTX ().

## 2024-02-29 NOTE — PROCEDURE NOTE - ADDITIONAL PROCEDURE DETAILS
Hemodynamically stable post epidural placement  Analgesia at T10 R=L  FH wnl    Bupivacaine 0.0625% + fentanyl 2mcg/ml  PCEA: demand dose 8ml; Lockout 10min  PIEB 8ml / 45mins

## 2024-02-29 NOTE — PROCEDURE NOTE - NSANESTHEXAM_OBGYN_ALL_OB_FT
40wk 5cm  VSS  FH wnl    Discussed with patient:    RISKS:  Hypotension; ADP with PDPH; Inadequate analgesia requiring replacement  Intrathecal catheter; intravascular catheter; backpain  BENEFITS:  Labor analgesia  ALTERNATIVES:  Intravenous analgesia with limitations and effect on     Accepts and consents

## 2024-02-29 NOTE — OB RN DELIVERY SUMMARY - NSSELHIDDEN_OBGYN_ALL_OB_FT
[NS_DeliveryAttending1_OBGYN_ALL_OB_FT:[email or web address]=],[NS_DeliveryRN_OBGYN_ALL_OB_FT:[email or web address]=]

## 2024-02-29 NOTE — OB PROVIDER DELIVERY SUMMARY - NSPROVIDERDELIVERYNOTE_OBGYN_ALL_OB_FT
pt delivered a viable male infant over 1st degree laceration   placenta delivered intact and spont.  pt stable mild lochia   uterus firm   laceration repaired with 3-0 chromic suture   baby to [facility] nursery

## 2024-02-29 NOTE — OB RN DELIVERY SUMMARY - BABY A: WEIGHT IN OUNCES (FROM GRAMS), DELIVERY
2/15/2018: NSR at 80 BPM with sinus arrythmia, all other segments and intervals were normal for age. 8

## 2024-02-29 NOTE — OB PROVIDER H&P - NSHPPHYSICALEXAM_GEN_ALL_CORE
Vital Signs Last 24 Hrs  HR: 91 (29 Feb 2024 08:50) (91 - 91)  BP: 129/75 (29 Feb 2024 08:50) (129/75 - 129/75)    EFM: 125/mod variability/accels +  toco: irregular  SVE: 4/60/-3, vertex by sono, intact  Speculum negative for lesions

## 2024-02-29 NOTE — OB PROVIDER H&P - ASSESSMENT
34 y/o  at 40w5d, GBS pos, grandmultip, LGA (AC 98%ile) IOL at term.     -admit to labor and delivery  -pain management prn   -continuous efm & toco  -admission labs  -IV access   -IV hydration   -diet: clear liquid diet   -GBS ppx: ampicillin  -IOL with pitocin    Discussed with Dr. Larkin and Dr. Houston.    32 y/o  at 40w5d, GBS pos, grandmultip, LGA (AC 98%ile) IOL at term.     -admit to labor and delivery  -pain management prn   -continuous efm & toco  -admission labs  -IV access   -IV hydration   -diet: clear liquid diet   -GBS ppx: ampicillin  -IOL with pitocin  -cross 2u PRBCs  -2 IVs    Discussed with Dr. Larkin and Dr. Houston.

## 2024-03-01 LAB
APTT BLD: 26.7 SEC — LOW (ref 27–39.2)
BASOPHILS # BLD AUTO: 0.03 K/UL — SIGNIFICANT CHANGE UP (ref 0–0.2)
BASOPHILS # BLD AUTO: 0.04 K/UL — SIGNIFICANT CHANGE UP (ref 0–0.2)
BASOPHILS NFR BLD AUTO: 0.2 % — SIGNIFICANT CHANGE UP (ref 0–1)
BASOPHILS NFR BLD AUTO: 0.3 % — SIGNIFICANT CHANGE UP (ref 0–1)
EOSINOPHIL # BLD AUTO: 0.08 K/UL — SIGNIFICANT CHANGE UP (ref 0–0.7)
EOSINOPHIL # BLD AUTO: 0.16 K/UL — SIGNIFICANT CHANGE UP (ref 0–0.7)
EOSINOPHIL NFR BLD AUTO: 0.6 % — SIGNIFICANT CHANGE UP (ref 0–8)
EOSINOPHIL NFR BLD AUTO: 1.2 % — SIGNIFICANT CHANGE UP (ref 0–8)
FIBRINOGEN PPP-MCNC: 338 MG/DL — SIGNIFICANT CHANGE UP (ref 200–435)
HCT VFR BLD CALC: 32.8 % — LOW (ref 37–47)
HCT VFR BLD CALC: 33 % — LOW (ref 37–47)
HGB BLD-MCNC: 11.4 G/DL — LOW (ref 12–16)
HGB BLD-MCNC: 11.6 G/DL — LOW (ref 12–16)
IMM GRANULOCYTES NFR BLD AUTO: 1.2 % — HIGH (ref 0.1–0.3)
IMM GRANULOCYTES NFR BLD AUTO: 1.6 % — HIGH (ref 0.1–0.3)
INR BLD: 1.04 RATIO — SIGNIFICANT CHANGE UP (ref 0.65–1.3)
LYMPHOCYTES # BLD AUTO: 1.6 K/UL — SIGNIFICANT CHANGE UP (ref 1.2–3.4)
LYMPHOCYTES # BLD AUTO: 1.65 K/UL — SIGNIFICANT CHANGE UP (ref 1.2–3.4)
LYMPHOCYTES # BLD AUTO: 11.4 % — LOW (ref 20.5–51.1)
LYMPHOCYTES # BLD AUTO: 12.4 % — LOW (ref 20.5–51.1)
MCHC RBC-ENTMCNC: 30.7 PG — SIGNIFICANT CHANGE UP (ref 27–31)
MCHC RBC-ENTMCNC: 31.4 PG — HIGH (ref 27–31)
MCHC RBC-ENTMCNC: 34.8 G/DL — SIGNIFICANT CHANGE UP (ref 32–37)
MCHC RBC-ENTMCNC: 35.2 G/DL — SIGNIFICANT CHANGE UP (ref 32–37)
MCV RBC AUTO: 88.4 FL — SIGNIFICANT CHANGE UP (ref 81–99)
MCV RBC AUTO: 89.2 FL — SIGNIFICANT CHANGE UP (ref 81–99)
MONOCYTES # BLD AUTO: 1.04 K/UL — HIGH (ref 0.1–0.6)
MONOCYTES # BLD AUTO: 1.1 K/UL — HIGH (ref 0.1–0.6)
MONOCYTES NFR BLD AUTO: 7.6 % — SIGNIFICANT CHANGE UP (ref 1.7–9.3)
MONOCYTES NFR BLD AUTO: 8.1 % — SIGNIFICANT CHANGE UP (ref 1.7–9.3)
NEUTROPHILS # BLD AUTO: 11.5 K/UL — HIGH (ref 1.4–6.5)
NEUTROPHILS # BLD AUTO: 9.86 K/UL — HIGH (ref 1.4–6.5)
NEUTROPHILS NFR BLD AUTO: 76.4 % — HIGH (ref 42.2–75.2)
NEUTROPHILS NFR BLD AUTO: 79 % — HIGH (ref 42.2–75.2)
NRBC # BLD: 0 /100 WBCS — SIGNIFICANT CHANGE UP (ref 0–0)
NRBC # BLD: 0 /100 WBCS — SIGNIFICANT CHANGE UP (ref 0–0)
PLATELET # BLD AUTO: 180 K/UL — SIGNIFICANT CHANGE UP (ref 130–400)
PLATELET # BLD AUTO: 190 K/UL — SIGNIFICANT CHANGE UP (ref 130–400)
PMV BLD: 11.3 FL — HIGH (ref 7.4–10.4)
PMV BLD: 11.4 FL — HIGH (ref 7.4–10.4)
PROTHROM AB SERPL-ACNC: 11.9 SEC — SIGNIFICANT CHANGE UP (ref 9.95–12.87)
RBC # BLD: 3.7 M/UL — LOW (ref 4.2–5.4)
RBC # BLD: 3.71 M/UL — LOW (ref 4.2–5.4)
RBC # FLD: 12.7 % — SIGNIFICANT CHANGE UP (ref 11.5–14.5)
RBC # FLD: 12.8 % — SIGNIFICANT CHANGE UP (ref 11.5–14.5)
WBC # BLD: 12.9 K/UL — HIGH (ref 4.8–10.8)
WBC # BLD: 14.53 K/UL — HIGH (ref 4.8–10.8)
WBC # FLD AUTO: 12.9 K/UL — HIGH (ref 4.8–10.8)
WBC # FLD AUTO: 14.53 K/UL — HIGH (ref 4.8–10.8)

## 2024-03-01 PROCEDURE — 72170 X-RAY EXAM OF PELVIS: CPT | Mod: 26

## 2024-03-01 RX ORDER — ACETAMINOPHEN 500 MG
1000 TABLET ORAL ONCE
Refills: 0 | Status: COMPLETED | OUTPATIENT
Start: 2024-03-01 | End: 2024-03-01

## 2024-03-01 RX ORDER — IBUPROFEN 200 MG
600 TABLET ORAL EVERY 6 HOURS
Refills: 0 | Status: DISCONTINUED | OUTPATIENT
Start: 2024-03-01 | End: 2024-03-03

## 2024-03-01 RX ADMIN — SODIUM CHLORIDE 3 MILLILITER(S): 9 INJECTION INTRAMUSCULAR; INTRAVENOUS; SUBCUTANEOUS at 14:57

## 2024-03-01 RX ADMIN — Medication 0.2 MILLIGRAM(S): at 11:28

## 2024-03-01 RX ADMIN — Medication 975 MILLIGRAM(S): at 15:07

## 2024-03-01 RX ADMIN — Medication 0.2 MILLIGRAM(S): at 05:31

## 2024-03-01 RX ADMIN — SODIUM CHLORIDE 3 MILLILITER(S): 9 INJECTION INTRAMUSCULAR; INTRAVENOUS; SUBCUTANEOUS at 06:30

## 2024-03-01 RX ADMIN — Medication 975 MILLIGRAM(S): at 08:43

## 2024-03-01 RX ADMIN — Medication 975 MILLIGRAM(S): at 08:13

## 2024-03-01 RX ADMIN — Medication 0.2 MILLIGRAM(S): at 18:22

## 2024-03-01 RX ADMIN — Medication 1 TABLET(S): at 11:28

## 2024-03-01 RX ADMIN — Medication 1000 MILLIGRAM(S): at 01:20

## 2024-03-01 RX ADMIN — Medication 975 MILLIGRAM(S): at 15:37

## 2024-03-01 RX ADMIN — Medication 975 MILLIGRAM(S): at 21:57

## 2024-03-01 RX ADMIN — Medication 400 MILLIGRAM(S): at 00:49

## 2024-03-01 NOTE — PHYSICAL THERAPY INITIAL EVALUATION ADULT - GAIT TRAINING, PT EVAL
Goal: 150' with RW mod I by D/C, Stair Training Goal: 11 steps with appropriate HR with supervision by D/C

## 2024-03-01 NOTE — CHART NOTE - NSCHARTNOTEFT_GEN_A_CORE
Post Labor  Epidural/ Delivery  Evaluation Note:    Uncomplicated anesthetic for Vaginal Delivery.    Patient seen at bedside. Epidural to be removed by RN before patient transfer.  Patient moving B/L lower extremities.  Motor block appropriate and resolving. Vital Signs are stable. Pain well controlled.     Noemi Score greater than 9    Mental Status:  __x__ Awake   ___x__ Alert   _____ Drowsy   _____ Sedated    Nausea/Vomiting:  __x__ NO  ______Yes,   See Post - Op Orders          Pain Scale (0-10):  __0___    Treatment: __X__ None       Plan: Discharge:   ____Home       __X___Floor     _____Critical Care    _____

## 2024-03-02 ENCOUNTER — TRANSCRIPTION ENCOUNTER (OUTPATIENT)
Age: 34
End: 2024-03-02

## 2024-03-02 RX ORDER — ACETAMINOPHEN 500 MG
3 TABLET ORAL
Qty: 0 | Refills: 0 | DISCHARGE
Start: 2024-03-02

## 2024-03-02 RX ORDER — AER TRAVELER 0.5 G/1
1 SOLUTION RECTAL; TOPICAL
Qty: 0 | Refills: 0 | DISCHARGE
Start: 2024-03-02

## 2024-03-02 RX ORDER — SIMETHICONE 80 MG/1
1 TABLET, CHEWABLE ORAL
Qty: 0 | Refills: 0 | DISCHARGE
Start: 2024-03-02

## 2024-03-02 RX ORDER — BENZOCAINE 10 %
1 GEL (GRAM) MUCOUS MEMBRANE
Qty: 0 | Refills: 0 | DISCHARGE
Start: 2024-03-02

## 2024-03-02 RX ORDER — IBUPROFEN 200 MG
1 TABLET ORAL
Qty: 0 | Refills: 0 | DISCHARGE
Start: 2024-03-02

## 2024-03-02 RX ADMIN — Medication 600 MILLIGRAM(S): at 23:56

## 2024-03-02 RX ADMIN — Medication 600 MILLIGRAM(S): at 17:27

## 2024-03-02 RX ADMIN — Medication 600 MILLIGRAM(S): at 18:27

## 2024-03-02 RX ADMIN — SODIUM CHLORIDE 3 MILLILITER(S): 9 INJECTION INTRAMUSCULAR; INTRAVENOUS; SUBCUTANEOUS at 14:18

## 2024-03-02 RX ADMIN — Medication 600 MILLIGRAM(S): at 06:34

## 2024-03-02 RX ADMIN — SODIUM CHLORIDE 3 MILLILITER(S): 9 INJECTION INTRAMUSCULAR; INTRAVENOUS; SUBCUTANEOUS at 21:14

## 2024-03-02 RX ADMIN — Medication 600 MILLIGRAM(S): at 12:17

## 2024-03-02 RX ADMIN — Medication 975 MILLIGRAM(S): at 21:35

## 2024-03-02 RX ADMIN — Medication 975 MILLIGRAM(S): at 20:35

## 2024-03-02 RX ADMIN — Medication 600 MILLIGRAM(S): at 11:17

## 2024-03-02 NOTE — DISCHARGE NOTE OB - PATIENT PORTAL LINK FT
You can access the FollowMyHealth Patient Portal offered by [facility] by registering at the following website: [email or web address]. By joining [employer]’s FollowMyHealth portal, you will also be able to view your health information using other applications (apps) compatible with our system.

## 2024-03-02 NOTE — DISCHARGE NOTE OB - MEDICATION SUMMARY - MEDICATIONS TO TAKE
I will START or STAY ON the medications listed below when I get home from the hospital:    ibuprofen 600 mg oral tablet  -- 1 tab(s) by mouth every 6 hours as needed for pain  -- Indication: For pain    acetaminophen 325 mg oral tablet  -- 3 tab(s) by mouth every 6 hours  -- Indication: For pain    benzocaine 20% topical spray  -- 1 Apply on skin to affected area every 6 hours As needed for Perineal discomfort  -- Indication: For perineal discomfort    witch hazel 50% topical pad  -- 1 Apply on skin to affected area every 4 hours As needed Perineal discomfort  -- Indication: For perineal discomfort    Prenatal Multivitamins with Folic Acid 1 mg oral tablet  -- 1 tab(s) by mouth once a day  -- Indication: For healthy mother    simethicone 80 mg oral tablet, chewable  -- 1 tab(s) by mouth every 4 hours As needed Gas  -- Indication: For gas pain

## 2024-03-02 NOTE — DISCHARGE NOTE OB - CARE PROVIDER_API CALL
Manny Cheatham  Obstetrics and Gynecology  [address] [place] [address]  Phone: [phone]  Fax: [phone]  Follow Up Time:

## 2024-03-02 NOTE — DISCHARGE NOTE OB - NS MD DC FALL RISK RISK
For information on Fall & Injury Prevention, visit: [email or web address] OR  [email or web address] OR  https://www.cdc.gov/steadi/patient.html

## 2024-03-03 VITALS
SYSTOLIC BLOOD PRESSURE: 122 MMHG | HEART RATE: 92 BPM | DIASTOLIC BLOOD PRESSURE: 63 MMHG | RESPIRATION RATE: 18 BRPM | TEMPERATURE: 98 F

## 2024-03-03 RX ADMIN — Medication 600 MILLIGRAM(S): at 01:00

## 2024-03-03 RX ADMIN — Medication 600 MILLIGRAM(S): at 11:05

## 2024-03-03 RX ADMIN — Medication 600 MILLIGRAM(S): at 12:05

## 2024-03-03 RX ADMIN — SODIUM CHLORIDE 3 MILLILITER(S): 9 INJECTION INTRAMUSCULAR; INTRAVENOUS; SUBCUTANEOUS at 06:12

## 2024-03-03 NOTE — PROGRESS NOTE ADULT - SUBJECTIVE AND OBJECTIVE BOX
OB attending  PPD #1    Pt doing well, pain well controlled. No overnight events, no acute complaints.    Ambulating: Yes  Voiding: Yes  Flatus: Yes  Bowel movements: Yes   Breast or bottle feeding: Breastfeeding  Diet: Regular    PAST MEDICAL & SURGICAL HISTORY:  No pertinent past medical history      H/O dilation and curettage          Physical Exam  Vital Signs Last 24 Hrs  T(C): 36.4 (01 Mar 2024 03:24), Max: 36.8 (2024 19:09)  T(F): 97.6 (01 Mar 2024 03:24), Max: 98.24 (2024 19:09)  HR: 76 (01 Mar 2024 03:24) (73 - 102)  BP: 125/58 (01 Mar 2024 03:24) (107/51 - 144/78)  BP(mean): --  RR: 18 (01 Mar 2024 03:24) (18 - 18)  SpO2: --      Gen: AAOx3, NAD  Abd: Soft, nontender, nondistended, BS+  Fundus: Firm, below umbilicus  Lochia: normal  Ext: No calf tenderness, no swelling    Labs:                        11.6   14.53 )-----------( 190      ( 01 Mar 2024 02:20 )             33.0         A/P:  s/p , PPD #1, doing well  - continue current management
OB attending  PPD #2    Pt doing well, pain well controlled. No overnight events, no acute complaints.    Ambulating: Yes  Voiding: Yes  Flatus: Yes  Bowel movements: Yes   Breast or bottle feeding: Breastfeeding  Diet: Regular    PAST MEDICAL & SURGICAL HISTORY:  No pertinent past medical history      H/O dilation and curettage          Physical Exam  Vital Signs Last 24 Hrs  T(C): 36.6 (01 Mar 2024 23:23), Max: 36.9 (01 Mar 2024 15:22)  T(F): 97.9 (01 Mar 2024 23:23), Max: 98.4 (01 Mar 2024 15:22)  HR: 75 (01 Mar 2024 23:23) (73 - 75)  BP: 116/59 (01 Mar 2024 23:23) (116/59 - 136/74)  BP(mean): --  RR: 18 (01 Mar 2024 23:23) (18 - 18)  SpO2: --      Gen: AAOx3, NAD  Abd: Soft, nontender, nondistended, BS+  Fundus: Firm, below umbilicus  Lochia: normal  Ext: No calf tenderness, no swelling    Labs:                        11.4   12.90 )-----------( 180      ( 01 Mar 2024 11:09 )             32.8   Rh+      A/P: s/p , PPD #2, LGA baby, mild pubic diastasis at 1.3 cm, , says pain better today, ambulating with walker well.   - continue current management  -will d/c tonight with instructions  -offered OPD PT, patient declined for now   -rv for pp 4-6 wks   -precautions reviewed 
OB attending  PPD #3    Pt doing well, pain well controlled. No overnight events, no acute complaints. Ambulating much better, w/o walker at this point     Ambulating: Yes  Voiding: Yes  Flatus: Yes  Bowel movements: Yes   Breast or bottle feeding: Breastfeeding  Diet: Regular    PAST MEDICAL & SURGICAL HISTORY:  No pertinent past medical history      H/O dilation and curettage          Physical Exam  Vital Signs Last 24 Hrs  T(C): 36.9 (03 Mar 2024 07:53), Max: 36.9 (03 Mar 2024 07:53)  T(F): 98.4 (03 Mar 2024 07:53), Max: 98.4 (03 Mar 2024 07:53)  HR: 92 (03 Mar 2024 07:53) (73 - 92)  BP: 122/63 (03 Mar 2024 07:53) (110/56 - 122/63)  BP(mean): --  RR: 18 (03 Mar 2024 07:53) (18 - 18)  SpO2: --      Gen: AAOx3, NAD  Abd: Soft, nontender, nondistended, BS+  Fundus: Firm, below umbilicus  Lochia: normal  Ext: No calf tenderness, no swelling    Labs:    Rh+    A/P: s/p , PPD #2,  LGA , mild pubic diastasis, clinically improved, doing well  - continue current management  -d/c home with instructions  -f/u 4-6 wks for pp exam 
PGY 1 Note    S: Patient seen and examined at bedside at bedside for evaluation of labor. Doing well, endorses increased pelvic pressure.     Vital Signs Last 24 Hrs  HR: 90 (2024 19:04) (75 - 102)  BP: 124/58 (2024 19:04) (107/51 - 137/61)    EFM: 130/mod/+accel  TOCO: q5min  SVE: ant lip/100/-1 per Dr. Houston    Labs:                        12.3   8.86  )-----------( 198      ( 2024 09:44 )             35.2             ABO RH Interpretation: AB POS (24 @ 09:43)    Urinalysis Basic - ( 2024 09:45 )    Color: Yellow / Appearance: Cloudy / S.023 / pH: x  Gluc: x / Ketone: 15 mg/dL  / Bili: Negative / Urobili: 1.0 mg/dL   Blood: x / Protein: Trace mg/dL / Nitrite: Negative   Leuk Esterase: Small / RBC: 1 /HPF / WBC 24 /HPF   Sq Epi: x / Non Sq Epi: 24 /HPF / Bacteria: Occasional /HPF        Prenatal Syphilis Test: Nonreact (24 @ 09:43)      Medications:  ABO RH Interpretation: AB POS (24 @ 09:43)     Pitocin: @5mU  Epi: in place

## 2024-03-08 DIAGNOSIS — O48.0 POST-TERM PREGNANCY: ICD-10-CM

## 2024-03-08 DIAGNOSIS — O36.63X0 MATERNAL CARE FOR EXCESSIVE FETAL GROWTH, THIRD TRIMESTER, NOT APPLICABLE OR UNSPECIFIED: ICD-10-CM

## 2024-03-08 DIAGNOSIS — Z28.21 IMMUNIZATION NOT CARRIED OUT BECAUSE OF PATIENT REFUSAL: ICD-10-CM

## 2024-03-08 DIAGNOSIS — Z28.09 IMMUNIZATION NOT CARRIED OUT BECAUSE OF OTHER CONTRAINDICATION: ICD-10-CM

## 2024-03-08 DIAGNOSIS — O40.3XX0 POLYHYDRAMNIOS, THIRD TRIMESTER, NOT APPLICABLE OR UNSPECIFIED: ICD-10-CM

## 2024-03-08 DIAGNOSIS — Z3A.40 40 WEEKS GESTATION OF PREGNANCY: ICD-10-CM

## 2024-03-12 ENCOUNTER — NON-APPOINTMENT (OUTPATIENT)
Age: 34
End: 2024-03-12

## 2024-03-27 ENCOUNTER — NON-APPOINTMENT (OUTPATIENT)
Age: 34
End: 2024-03-27

## 2024-04-03 NOTE — OB PROVIDER DELIVERY SUMMARY - NS_GROUP_OBGYN_ALL_OB_FT
Wellness Visit for Adults   AMBULATORY CARE:   A wellness visit  is when you see your healthcare provider to get screened for health problems. Your healthcare provider will also give you advice on how to stay healthy. Write down your questions so you remember to ask them. Ask your healthcare provider how often you should have a wellness visit.  What happens at a wellness visit:  Your healthcare provider will ask about your health, and your family history of health problems. This includes high blood pressure, heart disease, and cancer. He or she will ask if you have symptoms that concern you, if you smoke, and about your mood. You may also be asked about your intake of medicines, supplements, food, and alcohol. Any of the following may be done:  Your weight  will be checked. Your height may also be checked so your body mass index (BMI) can be calculated. Your BMI shows if you are at a healthy weight.    Your blood pressure  and heart rate will be checked. Your temperature may also be checked.    Blood and urine tests  may be done. Blood tests may be done to check your cholesterol levels. Abnormal cholesterol levels increase your risk for heart disease and stroke. You may also need a blood or urine test to check for diabetes if you are at increased risk. Urine tests may be done to look for signs of an infection or kidney disease.    A physical exam  includes checking your heartbeat and lungs with a stethoscope. Your healthcare provider may also check your skin to look for sun damage.    Screening tests  may be recommended. A screening test is done to check for diseases that may not cause symptoms. The screening tests you may need depend on your age, gender, family history, and lifestyle habits. For example, colorectal screening may be recommended if you are 50 years old or older.    Screening tests you need if you are a woman:   A Pap smear  is used to screen for cervical cancer. Pap smears are usually done every 3 to  5 years depending on your age. You may need them more often if you have had abnormal Pap smear test results in the past. Ask your healthcare provider how often you should have a Pap smear.    A mammogram  is an x-ray of your breasts to screen for breast cancer. Experts recommend mammograms every 2 years starting at age 50 years. You may need a mammogram at age 49 years or younger if you have an increased risk for breast cancer. Talk to your healthcare provider about when you should start having mammograms and how often you need them.    Vaccines you may need:   Get an influenza vaccine  every year. The influenza vaccine protects you from the flu. Several types of viruses cause the flu. The viruses change over time, so new vaccines are made each year.    Get a tetanus-diphtheria (Td) booster vaccine  every 10 years. This vaccine protects you against tetanus and diphtheria. Tetanus is a severe infection that may cause painful muscle spasms and lockjaw. Diphtheria is a severe bacterial infection that causes a thick covering in the back of your mouth and throat.    Get a human papillomavirus (HPV) vaccine  if you are female and aged 19 to 26 or male 19 to 21 and never received it. This vaccine protects you from HPV infection. HPV is the most common infection spread by sexual contact. HPV may also cause vaginal, penile, and anal cancers.    Get a pneumococcal vaccine  if you are aged 65 years or older. The pneumococcal vaccine is an injection given to protect you from pneumococcal disease. Pneumococcal disease is an infection caused by pneumococcal bacteria. The infection may cause pneumonia, meningitis, or an ear infection.    Get a shingles vaccine  if you are 60 or older, even if you have had shingles before. The shingles vaccine is an injection to protect you from the varicella-zoster virus. This is the same virus that causes chickenpox. Shingles is a painful rash that develops in people who had chickenpox or have  been exposed to the virus.    How to eat healthy:  My Plate is a model for planning healthy meals. It shows the types and amounts of foods that should go on your plate. Fruits and vegetables make up about half of your plate, and grains and protein make up the other half. A serving of dairy is included on the side of your plate. The amount of calories and serving sizes you need depends on your age, gender, weight, and height. Examples of healthy foods are listed below:  Eat a variety of vegetables  such as dark green, red, and orange vegetables. You can also include canned vegetables low in sodium (salt) and frozen vegetables without added butter or sauces.    Eat a variety of fresh fruits , canned fruit in 100% juice, frozen fruit, and dried fruit.    Include whole grains.  At least half of the grains you eat should be whole grains. Examples include whole-wheat bread, wheat pasta, brown rice, and whole-grain cereals such as oatmeal.    Eat a variety of protein foods such as seafood (fish and shellfish), lean meat, and poultry without skin (turkey and chicken). Examples of lean meats include pork leg, shoulder, or tenderloin, and beef round, sirloin, tenderloin, and extra lean ground beef. Other protein foods include eggs and egg substitutes, beans, peas, soy products, nuts, and seeds.    Choose low-fat dairy products such as skim or 1% milk or low-fat yogurt, cheese, and cottage cheese.    Limit unhealthy fats  such as butter, hard margarine, and shortening.       Exercise:  Exercise at least 30 minutes per day on most days of the week. Some examples of exercise include walking, biking, dancing, and swimming. You can also fit in more physical activity by taking the stairs instead of the elevator or parking farther away from stores. Include muscle strengthening activities 2 days each week. Regular exercise provides many health benefits. It helps you manage your weight, and decreases your risk for type 2 diabetes,  heart disease, stroke, and high blood pressure. Exercise can also help improve your mood. Ask your healthcare provider about the best exercise plan for you.       General health and safety guidelines:   Do not smoke.  Nicotine and other chemicals in cigarettes and cigars can cause lung damage. Ask your healthcare provider for information if you currently smoke and need help to quit. E-cigarettes or smokeless tobacco still contain nicotine. Talk to your healthcare provider before you use these products.    Limit alcohol.  A drink of alcohol is 12 ounces of beer, 5 ounces of wine, or 1½ ounces of liquor.    Lose weight, if needed.  Being overweight increases your risk of certain health conditions. These include heart disease, high blood pressure, type 2 diabetes, and certain types of cancer.    Protect your skin.  Do not sunbathe or use tanning beds. Use sunscreen with a SPF 15 or higher. Apply sunscreen at least 15 minutes before you go outside. Reapply sunscreen every 2 hours. Wear protective clothing, hats, and sunglasses when you are outside.    Drive safely.  Always wear your seatbelt. Make sure everyone in your car wears a seatbelt. A seatbelt can save your life if you are in an accident. Do not use your cell phone when you are driving. This could distract you and cause an accident. Pull over if you need to make a call or send a text message.    Practice safe sex.  Use latex condoms if are sexually active and have more than one partner. Your healthcare provider may recommend screening tests for sexually transmitted infections (STIs).    Wear helmets, lifejackets, and protective gear.  Always wear a helmet when you ride a bike or motorcycle, go skiing, or play sports that could cause a head injury. Wear protective equipment when you play sports. Wear a lifejacket when you are on a boat or doing water sports.    © Copyright [employer] 2023 Information is for End User's use only and may not be sold, redistributed or  otherwise used for commercial purposes.  The above information is an  only. It is not intended as medical advice for individual conditions or treatments. Talk to your doctor, nurse or pharmacist before following any medical regimen to see if it is safe and effective for you.     [employer]

## 2024-04-15 ENCOUNTER — APPOINTMENT (OUTPATIENT)
Dept: OBGYN | Facility: CLINIC | Age: 34
End: 2024-04-15
Payer: MEDICAID

## 2024-04-15 VITALS
BODY MASS INDEX: 29.02 KG/M2 | DIASTOLIC BLOOD PRESSURE: 83 MMHG | SYSTOLIC BLOOD PRESSURE: 131 MMHG | WEIGHT: 170 LBS | HEIGHT: 64 IN

## 2024-04-15 DIAGNOSIS — Z30.430 ENCOUNTER FOR INSERTION OF INTRAUTERINE CONTRACEPTIVE DEVICE: ICD-10-CM

## 2024-04-15 LAB
BILIRUB UR QL STRIP: NORMAL
GLUCOSE UR-MCNC: NORMAL
HCG UR QL: 0.2 EU/DL
HCG UR QL: NEGATIVE
HGB UR QL STRIP.AUTO: NORMAL
KETONES UR-MCNC: NORMAL
LEUKOCYTE ESTERASE UR QL STRIP: NORMAL
NITRITE UR QL STRIP: NORMAL
PH UR STRIP: 5
PROT UR STRIP-MCNC: NORMAL
SP GR UR STRIP: 1.02

## 2024-04-15 PROCEDURE — 81003 URINALYSIS AUTO W/O SCOPE: CPT | Mod: QW

## 2024-04-15 PROCEDURE — 0503F POSTPARTUM CARE VISIT: CPT

## 2024-04-15 PROCEDURE — 81025 URINE PREGNANCY TEST: CPT

## 2024-04-15 PROCEDURE — 58300 INSERT INTRAUTERINE DEVICE: CPT

## 2024-04-15 PROCEDURE — 76830 TRANSVAGINAL US NON-OB: CPT

## 2024-04-15 NOTE — HISTORY OF PRESENT ILLNESS
[Complications:___] : no complications [Last Pap Date: ___] : Last Pap Date: [unfilled] [Delivery Date: ___] : on [unfilled] [] : delivered by vaginal delivery [Male] : Delivery History: baby boy [Wt. ___] : weighing [unfilled] [Breastfeeding] : currently nursing [Breast Pain] : no breast pain [S/Sx PP Depression] : no signs/symptoms of postpartum depression [Back to Normal] : is back to normal in size [Normal] : the vagina was normal [Cervix Sample Taken] : cervical sample not taken for a Pap smear [Examination Of The Breasts] : breasts are normal [Doing Well] : is doing well [None] : None [FreeTextEntry8] : Here for PP exam  [de-identified] : PHQ2  neg [de-identified] : Normal PP exam  [de-identified] : Pap due 2025, Gc ct sent, bse rev, iud inserted , rv 4 wks then annual, exam.

## 2024-04-15 NOTE — HISTORY OF PRESENT ILLNESS
[Complications:___] : no complications [Last Pap Date: ___] : Last Pap Date: [unfilled] [Delivery Date: ___] : on [unfilled] [] : delivered by vaginal delivery [Male] : Delivery History: baby boy [Wt. ___] : weighing [unfilled] [Breastfeeding] : currently nursing [Breast Pain] : no breast pain [S/Sx PP Depression] : no signs/symptoms of postpartum depression [Back to Normal] : is back to normal in size [Normal] : the vagina was normal [Cervix Sample Taken] : cervical sample not taken for a Pap smear [Examination Of The Breasts] : breasts are normal [Doing Well] : is doing well [None] : None [FreeTextEntry8] : Here for PP exam  [de-identified] : PHQ2  neg [de-identified] : Normal PP exam  [de-identified] : Pap due 2025, Gc ct sent, bse rev, iud inserted , rv 4 wks then annual, exam.

## 2024-04-16 LAB
C TRACH RRNA SPEC QL NAA+PROBE: NOT DETECTED
N GONORRHOEA RRNA SPEC QL NAA+PROBE: NOT DETECTED
SOURCE AMPLIFICATION: NORMAL

## 2024-04-17 ENCOUNTER — APPOINTMENT (OUTPATIENT)
Dept: OBGYN | Facility: CLINIC | Age: 34
End: 2024-04-17
Payer: MEDICAID

## 2024-04-17 VITALS
DIASTOLIC BLOOD PRESSURE: 78 MMHG | HEIGHT: 64 IN | WEIGHT: 170 LBS | SYSTOLIC BLOOD PRESSURE: 121 MMHG | BODY MASS INDEX: 29.02 KG/M2

## 2024-04-17 DIAGNOSIS — O92.79 OTHER DISORDERS OF LACTATION: ICD-10-CM

## 2024-04-17 PROCEDURE — 99459 PELVIC EXAMINATION: CPT

## 2024-04-17 PROCEDURE — 99213 OFFICE O/P EST LOW 20 MIN: CPT | Mod: 24

## 2024-04-18 NOTE — PROCEDURE
[Locate IUD] : locate IUD [Transvaginal Ultrasound] : transvaginal ultrasound [Anteverted] : anteverted [Not Visualized] : not visualized [IUD Placement] : intrauterine device (IUD) placement [Time out performed] : Pre-procedure time out performed.  Patient's name, date of birth and procedure confirmed. [Consent Obtained] : Consent obtained [Prevention of Pregnancy] : prevention of pregnancy [Risks] : risks [Benefits] : benefits [Alternatives] : alternatives [Patient] : patient [Infection] : infection [Bleeding] : bleeding [Pain] : pain [Expulsion] : expulsion [Failure] : failure [Uterine Perforation] : uterine perforation [Neg Pregnancy Test] : negative pregnancy test [No Premedication] : No premedication [Betadine] : Betadine [Tenaculum] : Tenaculum [Easy Passage] : Easy passage [Sounded to ___ cm] : sounded to [unfilled] ~Ucm [Post Placement Transvag. US] : post placement transvaginal ultrasound [ParaGard IUD] : ParaGard IUD [Tolerated Well] : Patient tolerated the procedure well [No Complications] : No complications [None] : None [FreeTextEntry5] : IUD seen in EE normal position tip in fundal region.  [FreeTextEntry4] : IUD in situ  [de-identified] : postpartum

## 2024-04-18 NOTE — PROCEDURE
[Locate IUD] : locate IUD [Transvaginal Ultrasound] : transvaginal ultrasound [Anteverted] : anteverted [Not Visualized] : not visualized [IUD Placement] : intrauterine device (IUD) placement [Time out performed] : Pre-procedure time out performed.  Patient's name, date of birth and procedure confirmed. [Consent Obtained] : Consent obtained [Prevention of Pregnancy] : prevention of pregnancy [Risks] : risks [Benefits] : benefits [Alternatives] : alternatives [Patient] : patient [Infection] : infection [Bleeding] : bleeding [Pain] : pain [Expulsion] : expulsion [Failure] : failure [Uterine Perforation] : uterine perforation [Neg Pregnancy Test] : negative pregnancy test [No Premedication] : No premedication [Betadine] : Betadine [Tenaculum] : Tenaculum [Easy Passage] : Easy passage [Sounded to ___ cm] : sounded to [unfilled] ~Ucm [Post Placement Transvag. US] : post placement transvaginal ultrasound [ParaGard IUD] : ParaGard IUD [Tolerated Well] : Patient tolerated the procedure well [No Complications] : No complications [None] : None [FreeTextEntry5] : IUD seen in EE normal position tip in fundal region.  [FreeTextEntry4] : IUD in situ  [de-identified] : postpartum

## 2024-04-23 PROBLEM — O92.79 POSTPARTUM GALACTOCELE: Status: ACTIVE | Noted: 2024-04-23

## 2024-04-23 NOTE — PLAN
[FreeTextEntry1] : galactocele precautions given no evidence of infection warm compress cont to nurse

## 2024-04-23 NOTE — HISTORY OF PRESENT ILLNESS
[FreeTextEntry1] : pt comes in for evaluation of left breast pain pt reports noticing a tender mass at 6 o'clock  tender to touch  breastfeeding no vag bleeding  no sob, no cp, no dyruia no fever no chills

## 2024-04-23 NOTE — PHYSICAL EXAM
[Chaperone Present] : A chaperone was present in the examining room during all aspects of the physical examination [40601] : A chaperone was present during the pelvic exam. [Appropriately responsive] : appropriately responsive [Alert] : alert [No Acute Distress] : no acute distress [Non-tender] : non-tender [Non-distended] : non-distended [No HSM] : No HSM [No Lesions] : no lesions [No Mass] : no mass [Oriented x3] : oriented x3 [Examination Of The Breasts] : a normal appearance [Normal] : normal [___cm] : a ~M [unfilled] ~Ucm mass was palpated deep to the nipple [Superficial] : superficial [Soft] : soft [Mobile] : mobile [Tender] : tender [6:00] : in the 6:00 position [No Masses] : no breast masses were palpable

## 2025-06-17 NOTE — OB RN PATIENT PROFILE - NS_DIETPREF_OBGYN_ALL_OB
Parry on Monday 7/7.   
K[place]

## 2025-07-21 ENCOUNTER — NON-APPOINTMENT (OUTPATIENT)
Age: 35
End: 2025-07-21

## 2025-07-21 ENCOUNTER — APPOINTMENT (OUTPATIENT)
Dept: OBGYN | Facility: CLINIC | Age: 35
End: 2025-07-21
Payer: MEDICAID

## 2025-07-21 VITALS — HEART RATE: 108 BPM | SYSTOLIC BLOOD PRESSURE: 127 MMHG | DIASTOLIC BLOOD PRESSURE: 84 MMHG

## 2025-07-21 VITALS
BODY MASS INDEX: 27.98 KG/M2 | DIASTOLIC BLOOD PRESSURE: 80 MMHG | WEIGHT: 163 LBS | HEART RATE: 99 BPM | SYSTOLIC BLOOD PRESSURE: 139 MMHG

## 2025-07-21 DIAGNOSIS — N93.8 OTHER SPECIFIED ABNORMAL UTERINE AND VAGINAL BLEEDING: ICD-10-CM

## 2025-07-21 DIAGNOSIS — Z01.411 ENCOUNTER FOR GYNECOLOGICAL EXAMINATION (GENERAL) (ROUTINE) WITH ABNORMAL FINDINGS: ICD-10-CM

## 2025-07-21 DIAGNOSIS — Z30.432 ENCOUNTER FOR REMOVAL OF INTRAUTERINE CONTRACEPTIVE DEVICE: ICD-10-CM

## 2025-07-21 LAB
BILIRUB UR QL STRIP: NORMAL
GLUCOSE UR-MCNC: NORMAL
HCG UR QL: 0.2 EU/DL
HCG UR QL: NEGATIVE
HGB UR QL STRIP.AUTO: NORMAL
KETONES UR-MCNC: NORMAL
LEUKOCYTE ESTERASE UR QL STRIP: NORMAL
NITRITE UR QL STRIP: NORMAL
PH UR STRIP: 5.5
PROT UR STRIP-MCNC: NORMAL
SP GR UR STRIP: 1.02

## 2025-07-21 PROCEDURE — 99459 PELVIC EXAMINATION: CPT

## 2025-07-21 PROCEDURE — 58301 REMOVE INTRAUTERINE DEVICE: CPT

## 2025-07-21 PROCEDURE — 81003 URINALYSIS AUTO W/O SCOPE: CPT | Mod: QW

## 2025-07-21 PROCEDURE — 99213 OFFICE O/P EST LOW 20 MIN: CPT | Mod: 25

## 2025-07-21 PROCEDURE — 81025 URINE PREGNANCY TEST: CPT

## 2025-07-21 PROCEDURE — 76830 TRANSVAGINAL US NON-OB: CPT

## 2025-07-21 PROCEDURE — 99395 PREV VISIT EST AGE 18-39: CPT

## 2025-07-22 LAB
ALBUMIN SERPL ELPH-MCNC: 4.9 G/DL
ALP BLD-CCNC: 52 U/L
ALT SERPL-CCNC: 21 U/L
ANION GAP SERPL CALC-SCNC: 19 MMOL/L
AST SERPL-CCNC: 24 U/L
BILIRUB SERPL-MCNC: 0.4 MG/DL
BUN SERPL-MCNC: 13 MG/DL
CALCIUM SERPL-MCNC: 9.6 MG/DL
CHLORIDE SERPL-SCNC: 102 MMOL/L
CO2 SERPL-SCNC: 19 MMOL/L
CREAT SERPL-MCNC: 0.77 MG/DL
EGFRCR SERPLBLD CKD-EPI 2021: 104 ML/MIN/1.73M2
ESTIMATED AVERAGE GLUCOSE: 100 MG/DL
FSH SERPL-MCNC: 3.3 IU/L
GLUCOSE SERPL-MCNC: 101 MG/DL
HBA1C MFR BLD HPLC: 5.1 %
LH SERPL-ACNC: 2.3 IU/L
POTASSIUM SERPL-SCNC: 3.5 MMOL/L
PROT SERPL-MCNC: 7.7 G/DL
SODIUM SERPL-SCNC: 140 MMOL/L
T4 FREE SERPL-MCNC: 1.4 NG/DL
TSH SERPL-ACNC: 2.03 UIU/ML

## 2025-07-23 LAB
BASOPHILS # BLD AUTO: 0.07 K/UL
BASOPHILS NFR BLD AUTO: 0.7 %
C TRACH RRNA SPEC QL NAA+PROBE: NOT DETECTED
EOSINOPHIL # BLD AUTO: 0.44 K/UL
EOSINOPHIL NFR BLD AUTO: 4.5 %
HCT VFR BLD CALC: 41.8 %
HGB BLD-MCNC: 13.3 G/DL
HPV HIGH+LOW RISK DNA PNL CVX: NOT DETECTED
IMM GRANULOCYTES NFR BLD AUTO: 0.1 %
LYMPHOCYTES # BLD AUTO: 2.44 K/UL
LYMPHOCYTES NFR BLD AUTO: 24.9 %
MAN DIFF?: NORMAL
MCHC RBC-ENTMCNC: 29.4 PG
MCHC RBC-ENTMCNC: 31.8 G/DL
MCV RBC AUTO: 92.3 FL
MONOCYTES # BLD AUTO: 0.69 K/UL
MONOCYTES NFR BLD AUTO: 7.1 %
N GONORRHOEA RRNA SPEC QL NAA+PROBE: NOT DETECTED
NEUTROPHILS # BLD AUTO: 6.13 K/UL
NEUTROPHILS NFR BLD AUTO: 62.7 %
PLATELET # BLD AUTO: 353 K/UL
RBC # BLD: 4.53 M/UL
RBC # FLD: 12.4 %
SOURCE TP AMPLIFICATION: NORMAL
WBC # FLD AUTO: 9.78 K/UL

## 2025-07-27 LAB
ACTINOMYCES CULTURE FOR IUD: NORMAL
CYTOLOGY CVX/VAG DOC THIN PREP: ABNORMAL

## 2025-09-08 ENCOUNTER — APPOINTMENT (OUTPATIENT)
Dept: OBGYN | Facility: CLINIC | Age: 35
End: 2025-09-08
Payer: MEDICAID

## 2025-09-08 VITALS
WEIGHT: 165 LBS | SYSTOLIC BLOOD PRESSURE: 125 MMHG | DIASTOLIC BLOOD PRESSURE: 81 MMHG | BODY MASS INDEX: 28.32 KG/M2 | HEART RATE: 103 BPM

## 2025-09-08 DIAGNOSIS — O46.8X9 OTHER SPECIFIED DISORDERS OF AMNIOTIC FLUID AND MEMBRANES, UNSPECIFIED TRIMESTER, NOT APPLICABLE OR UNSPECIFIED: ICD-10-CM

## 2025-09-08 DIAGNOSIS — O41.8X90 OTHER SPECIFIED DISORDERS OF AMNIOTIC FLUID AND MEMBRANES, UNSPECIFIED TRIMESTER, NOT APPLICABLE OR UNSPECIFIED: ICD-10-CM

## 2025-09-08 DIAGNOSIS — Z32.01 ENCOUNTER FOR PREGNANCY TEST, RESULT POSITIVE: ICD-10-CM

## 2025-09-08 LAB
BILIRUB UR QL STRIP: NORMAL
GLUCOSE UR-MCNC: NORMAL
HCG UR QL: 0.2 EU/DL
HCG UR QL: POSITIVE
HGB UR QL STRIP.AUTO: NORMAL
KETONES UR-MCNC: NORMAL
LEUKOCYTE ESTERASE UR QL STRIP: NORMAL
NITRITE UR QL STRIP: NORMAL
PH UR STRIP: 6
PROT UR STRIP-MCNC: NORMAL
QUALITY CONTROL: YES
SP GR UR STRIP: 1.02

## 2025-09-08 PROCEDURE — 81003 URINALYSIS AUTO W/O SCOPE: CPT | Mod: QW

## 2025-09-08 PROCEDURE — 99213 OFFICE O/P EST LOW 20 MIN: CPT | Mod: 25

## 2025-09-08 PROCEDURE — 81025 URINE PREGNANCY TEST: CPT

## 2025-09-08 PROCEDURE — 99459 PELVIC EXAMINATION: CPT

## 2025-09-08 PROCEDURE — 76817 TRANSVAGINAL US OBSTETRIC: CPT
